# Patient Record
Sex: FEMALE | Race: WHITE | NOT HISPANIC OR LATINO | Employment: UNEMPLOYED | URBAN - NONMETROPOLITAN AREA
[De-identification: names, ages, dates, MRNs, and addresses within clinical notes are randomized per-mention and may not be internally consistent; named-entity substitution may affect disease eponyms.]

---

## 2023-11-27 ENCOUNTER — OFFICE VISIT (OUTPATIENT)
Dept: PRIMARY CARE | Facility: CLINIC | Age: 63
End: 2023-11-27
Payer: COMMERCIAL

## 2023-11-27 ENCOUNTER — PHARMACY VISIT (OUTPATIENT)
Dept: PHARMACY | Facility: CLINIC | Age: 63
End: 2023-11-27
Payer: COMMERCIAL

## 2023-11-27 VITALS
DIASTOLIC BLOOD PRESSURE: 75 MMHG | SYSTOLIC BLOOD PRESSURE: 136 MMHG | HEART RATE: 88 BPM | BODY MASS INDEX: 33.8 KG/M2 | HEIGHT: 64 IN | WEIGHT: 198 LBS

## 2023-11-27 DIAGNOSIS — G47.419 PRIMARY NARCOLEPSY WITHOUT CATAPLEXY (HHS-HCC): ICD-10-CM

## 2023-11-27 DIAGNOSIS — R60.0 BILATERAL LEG EDEMA: ICD-10-CM

## 2023-11-27 DIAGNOSIS — M79.7 FIBROMYALGIA: Primary | ICD-10-CM

## 2023-11-27 DIAGNOSIS — F31.81 BIPOLAR 2 DISORDER (MULTI): ICD-10-CM

## 2023-11-27 DIAGNOSIS — F41.9 ANXIETY DISORDER, UNSPECIFIED TYPE: ICD-10-CM

## 2023-11-27 PROBLEM — Z86.59 PERSONAL HISTORY OF OTHER MENTAL AND BEHAVIORAL DISORDERS: Status: ACTIVE | Noted: 2023-11-27

## 2023-11-27 PROCEDURE — 1036F TOBACCO NON-USER: CPT | Performed by: PHYSICIAN ASSISTANT

## 2023-11-27 PROCEDURE — 99214 OFFICE O/P EST MOD 30 MIN: CPT | Performed by: PHYSICIAN ASSISTANT

## 2023-11-27 RX ORDER — METHYLPHENIDATE HYDROCHLORIDE 5 MG/1
5 TABLET ORAL DAILY PRN
COMMUNITY

## 2023-11-27 RX ORDER — QUETIAPINE FUMARATE 50 MG/1
50 TABLET, FILM COATED ORAL NIGHTLY
Qty: 30 TABLET | Refills: 1 | Status: SHIPPED | OUTPATIENT
Start: 2023-11-27 | End: 2023-12-28 | Stop reason: WASHOUT

## 2023-11-27 ASSESSMENT — PATIENT HEALTH QUESTIONNAIRE - PHQ9
2. FEELING DOWN, DEPRESSED OR HOPELESS: NEARLY EVERY DAY
SUM OF ALL RESPONSES TO PHQ9 QUESTIONS 1 AND 2: 6
1. LITTLE INTEREST OR PLEASURE IN DOING THINGS: NEARLY EVERY DAY

## 2023-11-27 NOTE — PROGRESS NOTES
"Subjective   Patient ID: Erma Castro is a 63 y.o. female who presents for Depression (Patient having increased depression x 1 month and states Lamictal not working well, states gets very tearful easily , angry and frustrated. ).  HPI  Patient presents with multiple complaints.    Patient is requesting possible treatment for worsening bipolar 2.  She reports increased depression, tearfulness, mood instability, and quick to anger.  Patient does see psychiatry but does not have wilbert in this provider.  Patient is requesting a referral to a different psychiatrist and possible treatment in the meantime.  No reported suicidal ideation.    Patient is also requesting evaluation of lower extremity skin discoloration.  Patient states has been there for some time but has been worse lately.  No bleeding or drainage from the areas.  No reported attempted conservative management.  No known precipitating event.    Patient also is requesting a note for jury duty.  Patient has bipolar 2, narcolepsy, and fibromyalgia all of which would inhibit the patient from being an effective juror.  Patient is requesting a note to this effect to give to the courts.    Review of Systems    Constitutional:  See HPI   Integumentary: See HPI  Neurologic:  Alert and oriented X4, No numbness, No tingling.    All other systems are negative     Objective     /75   Pulse 88   Ht 1.613 m (5' 3.5\")   Wt 89.8 kg (198 lb)   BMI 34.52 kg/m²     Physical Exam    General:  Alert and oriented, No acute distress.    Eye:  Pupils are equal, round and reactive to light, Normal conjunctiva.    HENT:  Normocephalic,   Neck:  Supple    Respiratory: Respirations are non-labored   Musculoskeletal: Normal ROM and strength  Integumentary: Right lower extremity with 1+ pitting edema in left lower extremity with 0-1+ pitting edema; right lower leg with brownish skin color changes consistent with stasis dermatitis, chronic; there are 2 small areas of telangiectasia " type vessel structures; approximately, there are large areas of varicosities  Neurologic:  Alert, Oriented, Normal sensory, Cranial Nerves II-XII are grossly intact  Psychiatric:  Cooperative, Appropriate mood & affect.    Assessment/Plan   Anxiety/depression//Bipolar 2: So the patient reports having been on multiple medications in the past.  Patient is taking Lamictal currently.  Recommended Vraylar however it is not covered in the patient's insurance.  Will try Seroquel 50 mg nightly.  Follow-up 1 month.  Psychiatry referral was also arranged.    Note was written for jury duty    Lower extremity edema: There is mild lower extremity edema bilaterally.  Likely venous insufficiency.  Recommend over-the-counter grade compression socks.  Problem List Items Addressed This Visit       Anxiety disorder, unspecified    Relevant Orders    Referral to Psychiatry     Other Visit Diagnoses       Fibromyalgia    -  Primary    Bipolar 2 disorder (CMS/HCC)        Relevant Medications    QUEtiapine (SEROquel) 50 mg tablet    Other Relevant Orders    Referral to Psychiatry    Bilateral leg edema        Primary narcolepsy without cataplexy                Final diagnoses:   [M79.7] Fibromyalgia   [F31.81] Bipolar 2 disorder (CMS/HCC)   [R60.0] Bilateral leg edema   [G47.419] Primary narcolepsy without cataplexy   [F41.9] Anxiety disorder, unspecified type

## 2023-11-27 NOTE — LETTER
November 27, 2023     Patient: Erma Castro   YOB: 1960   Date of Visit: 11/27/2023       To Whom It May Concern:    Erma Castro was seen in my clinic on 11/27/2023 at 10:50 am and is under my care. Please excuse Erma from jury duty as she is treated for multiple health conditions that would inhibit her from being capable of serving as a juror.    If you have any questions or concerns, please don't hesitate to call.         Sincerely,         Scooter tSeven PA-C        CC: No Recipients

## 2023-12-05 ENCOUNTER — PHARMACY VISIT (OUTPATIENT)
Dept: PHARMACY | Facility: CLINIC | Age: 63
End: 2023-12-05
Payer: COMMERCIAL

## 2023-12-05 PROCEDURE — RXMED WILLOW AMBULATORY MEDICATION CHARGE

## 2023-12-07 ENCOUNTER — PHARMACY VISIT (OUTPATIENT)
Dept: PHARMACY | Facility: CLINIC | Age: 63
End: 2023-12-07

## 2023-12-07 PROCEDURE — RXOTC WILLOW AMBULATORY OTC CHARGE

## 2023-12-28 ENCOUNTER — OFFICE VISIT (OUTPATIENT)
Dept: PRIMARY CARE | Facility: CLINIC | Age: 63
End: 2023-12-28
Payer: COMMERCIAL

## 2023-12-28 VITALS
DIASTOLIC BLOOD PRESSURE: 77 MMHG | SYSTOLIC BLOOD PRESSURE: 119 MMHG | BODY MASS INDEX: 34.52 KG/M2 | WEIGHT: 202.2 LBS | HEIGHT: 64 IN | HEART RATE: 79 BPM

## 2023-12-28 DIAGNOSIS — F31.81 BIPOLAR 2 DISORDER (MULTI): ICD-10-CM

## 2023-12-28 DIAGNOSIS — S89.91XA INJURY OF RIGHT KNEE, INITIAL ENCOUNTER: Primary | ICD-10-CM

## 2023-12-28 PROCEDURE — 1036F TOBACCO NON-USER: CPT | Performed by: PHYSICIAN ASSISTANT

## 2023-12-28 PROCEDURE — 99213 OFFICE O/P EST LOW 20 MIN: CPT | Performed by: PHYSICIAN ASSISTANT

## 2023-12-28 RX ORDER — MELATONIN 3 MG
3 CAPSULE ORAL EVERY EVENING
COMMUNITY

## 2023-12-28 ASSESSMENT — PATIENT HEALTH QUESTIONNAIRE - PHQ9
SUM OF ALL RESPONSES TO PHQ9 QUESTIONS 1 AND 2: 2
1. LITTLE INTEREST OR PLEASURE IN DOING THINGS: SEVERAL DAYS
2. FEELING DOWN, DEPRESSED OR HOPELESS: SEVERAL DAYS

## 2023-12-28 NOTE — PROGRESS NOTES
"Subjective   Patient ID: Erma Castro is a 63 y.o. female who presents for Follow-up (FU 1 month, patient states did not start on Seroquel, discussed with therapist and pharmacist regarding side effect of drowsiness. Patient dx with narcolepsy and did not want any extra help with drowsiness./Patient states the Lamictal was therefore increased by psychiatrist at [facility].).  HPI  Patient presents in 1 month follow-up.  Patient was seen and treated here for bipolar 2 disorder 1 month ago and the Seroquel was prescribed.  Patient did not start this medication for fear of side effects.    Patient reports having fallen approximately 1 month ago.  Patient fell directly on the right knee and is having some persistent pain.  There is no pain with ambulation and no reduced function of the knee.  Patient reports pain with direct pressure when kneeling.  No other injuries as a result of the fall.  No other complaints.    Review of Systems    Constitutional:  See HPI     Musculoskeletal: see HPI  Integumentary:  No rash.    Neurologic:  Alert and oriented X4, No numbness, No tingling.    All other systems are negative     Objective     /77   Pulse 79   Ht 1.613 m (5' 3.5\")   Wt 91.7 kg (202 lb 3.2 oz)   BMI 35.26 kg/m²     Physical Exam    General:  Alert and oriented, No acute distress.    Eye:  Pupils are equal, round and reactive to light, Normal conjunctiva.    HENT:  Normocephalic,   Neck:  Supple    Respiratory: Respirations are non-labored   Musculoskeletal: Normal ROM and strength  Integumentary:  Warm, Dry, Intact, No pallor, No rash.    Neurologic:  Alert, Oriented, Normal sensory, Cranial Nerves II-XII are grossly intact  Psychiatric:  Cooperative, Appropriate mood & affect.    Assessment/Plan     Bipolar 2 disorder: Continue with psychiatry as scheduled    Injury of the right knee: Patient declined x-ray at this time.  Will revisit if pain persists  Problem List Items Addressed This Visit    None  Visit " Diagnoses       Injury of right knee, initial encounter    -  Primary    Bipolar 2 disorder (CMS/[facility])                Final diagnoses:   [S89.91XA] Injury of right knee, initial encounter   [F31.81] Bipolar 2 disorder (CMS/[facility])

## 2023-12-30 ENCOUNTER — PHARMACY VISIT (OUTPATIENT)
Dept: PHARMACY | Facility: CLINIC | Age: 63
End: 2023-12-30
Payer: COMMERCIAL

## 2023-12-30 PROCEDURE — RXMED WILLOW AMBULATORY MEDICATION CHARGE

## 2024-02-12 PROCEDURE — RXMED WILLOW AMBULATORY MEDICATION CHARGE

## 2024-02-21 ENCOUNTER — PHARMACY VISIT (OUTPATIENT)
Dept: PHARMACY | Facility: CLINIC | Age: 64
End: 2024-02-21
Payer: COMMERCIAL

## 2024-02-21 PROCEDURE — RXOTC WILLOW AMBULATORY OTC CHARGE

## 2024-03-25 ENCOUNTER — APPOINTMENT (OUTPATIENT)
Dept: SLEEP MEDICINE | Facility: CLINIC | Age: 64
End: 2024-03-25
Payer: COMMERCIAL

## 2024-03-28 ENCOUNTER — PHARMACY VISIT (OUTPATIENT)
Dept: PHARMACY | Facility: CLINIC | Age: 64
End: 2024-03-28

## 2024-03-28 PROCEDURE — RXOTC WILLOW AMBULATORY OTC CHARGE

## 2024-04-05 PROCEDURE — RXMED WILLOW AMBULATORY MEDICATION CHARGE

## 2024-04-06 ENCOUNTER — PHARMACY VISIT (OUTPATIENT)
Dept: PHARMACY | Facility: CLINIC | Age: 64
End: 2024-04-06
Payer: COMMERCIAL

## 2024-04-06 PROCEDURE — RXOTC WILLOW AMBULATORY OTC CHARGE

## 2024-05-03 ENCOUNTER — PHARMACY VISIT (OUTPATIENT)
Dept: PHARMACY | Facility: CLINIC | Age: 64
End: 2024-05-03
Payer: COMMERCIAL

## 2024-05-03 ENCOUNTER — OFFICE VISIT (OUTPATIENT)
Dept: PRIMARY CARE | Facility: CLINIC | Age: 64
End: 2024-05-03
Payer: COMMERCIAL

## 2024-05-03 ENCOUNTER — HOSPITAL ENCOUNTER (OUTPATIENT)
Dept: RADIOLOGY | Facility: HOSPITAL | Age: 64
Discharge: HOME | End: 2024-05-03
Payer: COMMERCIAL

## 2024-05-03 VITALS
DIASTOLIC BLOOD PRESSURE: 84 MMHG | HEIGHT: 64 IN | BODY MASS INDEX: 34.88 KG/M2 | HEART RATE: 83 BPM | SYSTOLIC BLOOD PRESSURE: 139 MMHG | WEIGHT: 204.3 LBS

## 2024-05-03 DIAGNOSIS — F31.81 BIPOLAR 2 DISORDER (MULTI): ICD-10-CM

## 2024-05-03 DIAGNOSIS — Z13.6 SCREENING FOR CARDIOVASCULAR CONDITION: ICD-10-CM

## 2024-05-03 DIAGNOSIS — Z86.59 PERSONAL HISTORY OF OTHER MENTAL AND BEHAVIORAL DISORDERS: ICD-10-CM

## 2024-05-03 DIAGNOSIS — Z12.31 BREAST CANCER SCREENING BY MAMMOGRAM: ICD-10-CM

## 2024-05-03 DIAGNOSIS — G47.419 PRIMARY NARCOLEPSY WITHOUT CATAPLEXY (HHS-HCC): ICD-10-CM

## 2024-05-03 DIAGNOSIS — S76.011A HIP STRAIN, RIGHT, INITIAL ENCOUNTER: ICD-10-CM

## 2024-05-03 DIAGNOSIS — S76.011A HIP STRAIN, RIGHT, INITIAL ENCOUNTER: Primary | ICD-10-CM

## 2024-05-03 PROCEDURE — 73502 X-RAY EXAM HIP UNI 2-3 VIEWS: CPT | Mod: RIGHT SIDE | Performed by: RADIOLOGY

## 2024-05-03 PROCEDURE — 73502 X-RAY EXAM HIP UNI 2-3 VIEWS: CPT | Mod: RT

## 2024-05-03 PROCEDURE — RXMED WILLOW AMBULATORY MEDICATION CHARGE

## 2024-05-03 RX ORDER — MELOXICAM 15 MG/1
15 TABLET ORAL DAILY
Qty: 30 TABLET | Refills: 1 | Status: SHIPPED | OUTPATIENT
Start: 2024-05-03 | End: 2024-06-04 | Stop reason: SDUPTHER

## 2024-05-03 RX ORDER — DICLOFENAC SODIUM 10 MG/G
4 GEL TOPICAL 4 TIMES DAILY
Qty: 100 G | Refills: 1 | Status: SHIPPED | OUTPATIENT
Start: 2024-05-03

## 2024-05-03 ASSESSMENT — ENCOUNTER SYMPTOMS
EYE REDNESS: 0
COUGH: 0
HALLUCINATIONS: 0
PALPITATIONS: 0
WEAKNESS: 0
FEVER: 0
CONSTIPATION: 0
HEMATURIA: 0
FATIGUE: 0
SINUS PRESSURE: 0
EYE ITCHING: 0
SHORTNESS OF BREATH: 0
SORE THROAT: 0
BACK PAIN: 0
VOMITING: 0
EYE PAIN: 0
CHILLS: 0
SINUS PAIN: 0
ABDOMINAL PAIN: 0
NUMBNESS: 0
COLOR CHANGE: 0
ARTHRALGIAS: 1
NAUSEA: 0
HEADACHES: 0
DIFFICULTY URINATING: 0
DIZZINESS: 0
DIARRHEA: 0
FLANK PAIN: 0

## 2024-05-03 NOTE — PROGRESS NOTES
"Subjective   Patient ID: Erma Castro is a 63 y.o. female who presents for Follow-up and Hip Pain (Right hip pain x 1+ weeks).    HPI   Right hip pain, the pain \"comes and goes\" mostly occurs when she is exerting it; bending down, squatting. Denies n/t, does not radiate down her leg, takes Advil every 6hrs plus Acetaminophen between doses, she reports only mild relief. She rates it 6/10 at it's worst. She does report some weakness.  Right groin/thigh strain: xrays, mobic and voltaren gel ordered     Sleep disturbances, she can fall asleep but does not stay asleep long enough. Waking up around 3 am. She does fall asleep during that day.     Sees [facility] psychiatry for her bipolar 2. She is currently taking Lamictal for bipolar.     Review of Systems   Constitutional:  Negative for chills, fatigue and fever.   HENT:  Negative for congestion, sinus pressure, sinus pain and sore throat.    Eyes:  Negative for pain, redness and itching.   Respiratory:  Negative for cough and shortness of breath.    Cardiovascular:  Negative for chest pain, palpitations and leg swelling.   Gastrointestinal:  Negative for abdominal pain, constipation, diarrhea, nausea and vomiting.   Endocrine: Negative for cold intolerance and heat intolerance.   Genitourinary:  Negative for difficulty urinating, flank pain and hematuria.   Musculoskeletal:  Positive for arthralgias. Negative for back pain and gait problem.        Right hip   Skin:  Negative for color change.   Neurological:  Negative for dizziness, weakness, numbness and headaches.   Psychiatric/Behavioral:  Negative for hallucinations and suicidal ideas.        Objective   /84 (Patient Position: Sitting)   Pulse 83   Ht 1.613 m (5' 3.5\")   Wt 92.7 kg (204 lb 4.8 oz)   BMI 35.62 kg/m²     Physical Exam  Vitals and nursing note reviewed.   Constitutional:       Appearance: Normal appearance.   HENT:      Right Ear: Tympanic membrane normal.      Nose: Nose normal.   Eyes:      " Extraocular Movements: Extraocular movements intact.      Pupils: Pupils are equal, round, and reactive to light.   Cardiovascular:      Rate and Rhythm: Normal rate and regular rhythm.   Pulmonary:      Effort: Pulmonary effort is normal.      Breath sounds: Normal breath sounds.   Abdominal:      General: Abdomen is flat. Bowel sounds are normal.      Palpations: Abdomen is soft.   Musculoskeletal:      Cervical back: Normal range of motion.      Right hip: Tenderness present. Decreased range of motion.   Skin:     General: Skin is warm and dry.      Capillary Refill: Capillary refill takes less than 2 seconds.   Neurological:      Mental Status: She is alert and oriented to person, place, and time.   Psychiatric:         Mood and Affect: Mood normal.         Behavior: Behavior normal.         Assessment/Plan   Problem List Items Addressed This Visit             ICD-10-CM       Mental Health    Personal history of other mental and behavioral disorders Z86.59    Relevant Orders    TSH with reflex to Free T4 if abnormal    Bipolar 2 disorder (Multi) F31.81       Sleep    Primary narcolepsy without cataplexy ([facility]) G47.419     Other Visit Diagnoses         Codes    Hip strain, right, initial encounter    -  Primary S76.011A    Xrays, Mobic, Voltaren gel. Will follow up with results, consider PT if no improvement     Relevant Medications    meloxicam (Mobic) 15 mg tablet    diclofenac sodium (Voltaren) 1 % gel    Other Relevant Orders    XR hip right with pelvis when performed 2 or 3 views    Screening for cardiovascular condition     Z13.6    CBC CMP Lipid panel ordered     Relevant Orders    Comprehensive Metabolic Panel    CBC and Auto Differential    Lipid Panel    Breast cancer screening by mammogram     Z12.31    mammogram ordered today     Relevant Orders    BI mammo bilateral screening tomosynthesis            Immunizations   Flu shot   COVID  2022  PNA --  Shingles declined  RSV declined     Mammo  ordered  today  DEXA -- declined today  Colon cancer screening declined today  Pap total hysterectomy

## 2024-05-03 NOTE — PATIENT INSTRUCTIONS
-Do not take Advil, Aleve or Ibuprofen while taking Mobic  -Can still take Acetaminophen while taking Mobic  -Voltaren gel, apply directly to hip and groin

## 2024-05-04 ENCOUNTER — LAB (OUTPATIENT)
Dept: LAB | Facility: LAB | Age: 64
End: 2024-05-04
Payer: COMMERCIAL

## 2024-05-04 DIAGNOSIS — Z13.6 SCREENING FOR CARDIOVASCULAR CONDITION: ICD-10-CM

## 2024-05-04 DIAGNOSIS — Z86.59 PERSONAL HISTORY OF OTHER MENTAL AND BEHAVIORAL DISORDERS: ICD-10-CM

## 2024-05-04 LAB
ALBUMIN SERPL BCP-MCNC: 4.3 G/DL (ref 3.4–5)
ALP SERPL-CCNC: 85 U/L (ref 33–136)
ALT SERPL W P-5'-P-CCNC: 19 U/L (ref 7–45)
ANION GAP SERPL CALC-SCNC: 9 MMOL/L (ref 10–20)
AST SERPL W P-5'-P-CCNC: 15 U/L (ref 9–39)
BASOPHILS # BLD AUTO: 0.04 X10*3/UL (ref 0–0.1)
BASOPHILS NFR BLD AUTO: 0.8 %
BILIRUB SERPL-MCNC: 0.4 MG/DL (ref 0–1.2)
BUN SERPL-MCNC: 18 MG/DL (ref 6–23)
CALCIUM SERPL-MCNC: 9.5 MG/DL (ref 8.6–10.3)
CHLORIDE SERPL-SCNC: 106 MMOL/L (ref 98–107)
CHOLEST SERPL-MCNC: 177 MG/DL (ref 0–199)
CHOLESTEROL/HDL RATIO: 2.7
CO2 SERPL-SCNC: 29 MMOL/L (ref 21–32)
CREAT SERPL-MCNC: 0.62 MG/DL (ref 0.5–1.05)
EGFRCR SERPLBLD CKD-EPI 2021: >90 ML/MIN/1.73M*2
EOSINOPHIL # BLD AUTO: 0.11 X10*3/UL (ref 0–0.7)
EOSINOPHIL NFR BLD AUTO: 2.2 %
ERYTHROCYTE [DISTWIDTH] IN BLOOD BY AUTOMATED COUNT: 13.2 % (ref 11.5–14.5)
GLUCOSE SERPL-MCNC: 91 MG/DL (ref 74–99)
HCT VFR BLD AUTO: 41.9 % (ref 36–46)
HDLC SERPL-MCNC: 65 MG/DL
HGB BLD-MCNC: 13.3 G/DL (ref 12–16)
IMM GRANULOCYTES # BLD AUTO: 0.01 X10*3/UL (ref 0–0.7)
IMM GRANULOCYTES NFR BLD AUTO: 0.2 % (ref 0–0.9)
LDLC SERPL CALC-MCNC: 97 MG/DL
LYMPHOCYTES # BLD AUTO: 1.67 X10*3/UL (ref 1.2–4.8)
LYMPHOCYTES NFR BLD AUTO: 33.4 %
MCH RBC QN AUTO: 30.1 PG (ref 26–34)
MCHC RBC AUTO-ENTMCNC: 31.7 G/DL (ref 32–36)
MCV RBC AUTO: 95 FL (ref 80–100)
MONOCYTES # BLD AUTO: 0.35 X10*3/UL (ref 0.1–1)
MONOCYTES NFR BLD AUTO: 7 %
NEUTROPHILS # BLD AUTO: 2.82 X10*3/UL (ref 1.2–7.7)
NEUTROPHILS NFR BLD AUTO: 56.4 %
NON HDL CHOLESTEROL: 112 MG/DL (ref 0–149)
NRBC BLD-RTO: 0 /100 WBCS (ref 0–0)
PLATELET # BLD AUTO: 221 X10*3/UL (ref 150–450)
POTASSIUM SERPL-SCNC: 4 MMOL/L (ref 3.5–5.3)
PROT SERPL-MCNC: 7 G/DL (ref 6.4–8.2)
RBC # BLD AUTO: 4.42 X10*6/UL (ref 4–5.2)
SODIUM SERPL-SCNC: 140 MMOL/L (ref 136–145)
TRIGL SERPL-MCNC: 75 MG/DL (ref 0–149)
TSH SERPL-ACNC: 1.74 MIU/L (ref 0.44–3.98)
VLDL: 15 MG/DL (ref 0–40)
WBC # BLD AUTO: 5 X10*3/UL (ref 4.4–11.3)

## 2024-05-04 PROCEDURE — 80053 COMPREHEN METABOLIC PANEL: CPT

## 2024-05-04 PROCEDURE — 85025 COMPLETE CBC W/AUTO DIFF WBC: CPT

## 2024-05-04 PROCEDURE — 80061 LIPID PANEL: CPT

## 2024-05-04 PROCEDURE — 84443 ASSAY THYROID STIM HORMONE: CPT

## 2024-05-04 PROCEDURE — 36415 COLL VENOUS BLD VENIPUNCTURE: CPT

## 2024-05-14 PROCEDURE — RXMED WILLOW AMBULATORY MEDICATION CHARGE

## 2024-05-15 ENCOUNTER — PHARMACY VISIT (OUTPATIENT)
Dept: PHARMACY | Facility: CLINIC | Age: 64
End: 2024-05-15
Payer: COMMERCIAL

## 2024-05-27 PROCEDURE — RXMED WILLOW AMBULATORY MEDICATION CHARGE

## 2024-05-28 ENCOUNTER — PHARMACY VISIT (OUTPATIENT)
Dept: PHARMACY | Facility: CLINIC | Age: 64
End: 2024-05-28
Payer: COMMERCIAL

## 2024-06-04 DIAGNOSIS — S76.011A HIP STRAIN, RIGHT, INITIAL ENCOUNTER: ICD-10-CM

## 2024-06-04 RX ORDER — MELOXICAM 15 MG/1
15 TABLET ORAL DAILY
Qty: 30 TABLET | Refills: 1 | Status: SHIPPED | OUTPATIENT
Start: 2024-06-04 | End: 2024-08-03

## 2024-06-19 ENCOUNTER — PHARMACY VISIT (OUTPATIENT)
Dept: PHARMACY | Facility: CLINIC | Age: 64
End: 2024-06-19
Payer: COMMERCIAL

## 2024-06-19 PROCEDURE — RXMED WILLOW AMBULATORY MEDICATION CHARGE

## 2024-06-19 RX ORDER — LAMOTRIGINE 150 MG/1
TABLET ORAL
Qty: 60 TABLET | Refills: 1 | OUTPATIENT
Start: 2024-06-19

## 2024-06-24 PROCEDURE — RXMED WILLOW AMBULATORY MEDICATION CHARGE

## 2024-06-28 ENCOUNTER — PHARMACY VISIT (OUTPATIENT)
Dept: PHARMACY | Facility: CLINIC | Age: 64
End: 2024-06-28
Payer: COMMERCIAL

## 2024-07-24 ENCOUNTER — PHARMACY VISIT (OUTPATIENT)
Dept: PHARMACY | Facility: CLINIC | Age: 64
End: 2024-07-24
Payer: COMMERCIAL

## 2024-07-24 PROCEDURE — RXMED WILLOW AMBULATORY MEDICATION CHARGE

## 2024-07-26 DIAGNOSIS — S76.011A HIP STRAIN, RIGHT, INITIAL ENCOUNTER: ICD-10-CM

## 2024-07-26 RX ORDER — MELOXICAM 15 MG/1
15 TABLET ORAL DAILY
Qty: 30 TABLET | Refills: 1 | Status: SHIPPED | OUTPATIENT
Start: 2024-07-26 | End: 2024-09-24

## 2024-08-02 PROCEDURE — RXMED WILLOW AMBULATORY MEDICATION CHARGE

## 2024-08-04 ENCOUNTER — PHARMACY VISIT (OUTPATIENT)
Dept: PHARMACY | Facility: CLINIC | Age: 64
End: 2024-08-04
Payer: COMMERCIAL

## 2024-08-04 PROCEDURE — RXOTC WILLOW AMBULATORY OTC CHARGE

## 2024-08-23 ENCOUNTER — PHARMACY VISIT (OUTPATIENT)
Dept: PHARMACY | Facility: CLINIC | Age: 64
End: 2024-08-23
Payer: COMMERCIAL

## 2024-08-23 PROCEDURE — RXMED WILLOW AMBULATORY MEDICATION CHARGE

## 2024-09-19 PROCEDURE — RXMED WILLOW AMBULATORY MEDICATION CHARGE

## 2024-09-21 ENCOUNTER — PHARMACY VISIT (OUTPATIENT)
Dept: PHARMACY | Facility: CLINIC | Age: 64
End: 2024-09-21
Payer: COMMERCIAL

## 2024-09-22 PROCEDURE — RXMED WILLOW AMBULATORY MEDICATION CHARGE

## 2024-09-25 ENCOUNTER — PHARMACY VISIT (OUTPATIENT)
Dept: PHARMACY | Facility: CLINIC | Age: 64
End: 2024-09-25
Payer: COMMERCIAL

## 2024-10-01 DIAGNOSIS — S76.011A HIP STRAIN, RIGHT, INITIAL ENCOUNTER: ICD-10-CM

## 2024-10-01 RX ORDER — MELOXICAM 15 MG/1
15 TABLET ORAL DAILY
Qty: 30 TABLET | Refills: 1 | Status: SHIPPED | OUTPATIENT
Start: 2024-10-01 | End: 2024-11-30

## 2024-10-17 ENCOUNTER — PHARMACY VISIT (OUTPATIENT)
Dept: PHARMACY | Facility: CLINIC | Age: 64
End: 2024-10-17
Payer: COMMERCIAL

## 2024-10-17 PROCEDURE — RXMED WILLOW AMBULATORY MEDICATION CHARGE

## 2024-10-22 PROCEDURE — RXMED WILLOW AMBULATORY MEDICATION CHARGE

## 2024-10-23 ENCOUNTER — PHARMACY VISIT (OUTPATIENT)
Dept: PHARMACY | Facility: CLINIC | Age: 64
End: 2024-10-23
Payer: COMMERCIAL

## 2024-11-07 ENCOUNTER — APPOINTMENT (OUTPATIENT)
Dept: PRIMARY CARE | Facility: CLINIC | Age: 64
End: 2024-11-07
Payer: COMMERCIAL

## 2024-11-07 VITALS
HEIGHT: 64 IN | WEIGHT: 198.8 LBS | BODY MASS INDEX: 33.94 KG/M2 | HEART RATE: 84 BPM | DIASTOLIC BLOOD PRESSURE: 81 MMHG | SYSTOLIC BLOOD PRESSURE: 142 MMHG

## 2024-11-07 DIAGNOSIS — M19.90 ARTHRITIS: Primary | ICD-10-CM

## 2024-11-07 DIAGNOSIS — G47.421 NARCOLEPSY DUE TO UNDERLYING CONDITION WITH CATAPLEXY (HHS-HCC): ICD-10-CM

## 2024-11-07 PROCEDURE — 99214 OFFICE O/P EST MOD 30 MIN: CPT

## 2024-11-07 PROCEDURE — 1036F TOBACCO NON-USER: CPT

## 2024-11-07 PROCEDURE — 3008F BODY MASS INDEX DOCD: CPT

## 2024-11-07 ASSESSMENT — ENCOUNTER SYMPTOMS
PALPITATIONS: 0
LIGHT-HEADEDNESS: 0
FEVER: 0
SHORTNESS OF BREATH: 0
FATIGUE: 0
CHILLS: 0
ARTHRALGIAS: 1
SLEEP DISTURBANCE: 0
DYSURIA: 0
ABDOMINAL PAIN: 0
COUGH: 0
HEADACHES: 0
NERVOUS/ANXIOUS: 1
JOINT SWELLING: 1

## 2024-11-07 NOTE — PROGRESS NOTES
"Subjective   Patient ID: Erma Castro is a 64 y.o. female who presents for 6 month follow-up.    HPI   Here today for 6 month follow up     Arthritis reports she is taking Mobic in the AM and tylenol 1,300 MG TID    Narcolepsy she has been unable to see a sleep specialist due to not taking new patients.     Reports stressors in life have increased and she feels sometimes her mood is not stable. She still follows with Gabrielle next apt in December.  Review of Systems   Constitutional:  Negative for chills, fatigue and fever.   Respiratory:  Negative for cough and shortness of breath.    Cardiovascular:  Negative for chest pain and palpitations.   Gastrointestinal:  Negative for abdominal pain.   Genitourinary:  Negative for dysuria.   Musculoskeletal:  Positive for arthralgias and joint swelling.   Neurological:  Negative for light-headedness and headaches.   Psychiatric/Behavioral:  Negative for sleep disturbance and suicidal ideas. The patient is nervous/anxious.        Objective   /81 (Patient Position: Sitting)   Pulse 84   Ht 1.613 m (5' 3.5\")   Wt 90.2 kg (198 lb 12.8 oz)   BMI 34.66 kg/m²     Physical Exam  Cardiovascular:      Rate and Rhythm: Normal rate and regular rhythm.      Heart sounds: Normal heart sounds.   Pulmonary:      Breath sounds: Normal breath sounds.   Musculoskeletal:      Right lower leg: No edema.      Left lower leg: No edema.   Skin:     Capillary Refill: Capillary refill takes less than 2 seconds.   Neurological:      Mental Status: She is alert and oriented to person, place, and time.         Assessment/Plan   Problem List Items Addressed This Visit    None  Visit Diagnoses         Codes    Arthritis    -  Primary M19.90    Relevant Orders    Referral to Orthopaedic Surgery    Narcolepsy due to underlying condition with cataplexy ([facility])     G47.421    Relevant Orders    Referral to Adult Sleep Medicine             Arthritis  -Xray right hip show calcification of inferior " to the ischial tuberosity  -No relief from Mobic and Tylenol   -Ortho referral     Bipolar  -Still follows with Dr. Pugh and Gabrielle  -She is taking Lamictal, may need additional medicine    Narcolepsy  -referral to sleep medicine

## 2024-11-15 ENCOUNTER — APPOINTMENT (OUTPATIENT)
Dept: ORTHOPEDIC SURGERY | Facility: CLINIC | Age: 64
End: 2024-11-15
Payer: COMMERCIAL

## 2024-11-15 VITALS — WEIGHT: 198 LBS | BODY MASS INDEX: 34.52 KG/M2

## 2024-11-15 DIAGNOSIS — M19.90 ARTHRITIS: ICD-10-CM

## 2024-11-15 DIAGNOSIS — M54.41 CHRONIC RIGHT-SIDED LOW BACK PAIN WITH RIGHT-SIDED SCIATICA: Primary | ICD-10-CM

## 2024-11-15 DIAGNOSIS — G89.29 CHRONIC RIGHT-SIDED LOW BACK PAIN WITH RIGHT-SIDED SCIATICA: Primary | ICD-10-CM

## 2024-11-15 DIAGNOSIS — M25.551 RIGHT HIP PAIN: ICD-10-CM

## 2024-11-15 DIAGNOSIS — M46.1 SI JOINT ARTHRITIS: ICD-10-CM

## 2024-11-15 PROCEDURE — 99204 OFFICE O/P NEW MOD 45 MIN: CPT | Performed by: NURSE PRACTITIONER

## 2024-11-15 RX ORDER — DEXTROMETHORPHAN HYDROBROMIDE, GUAIFENESIN 5; 100 MG/5ML; MG/5ML
LIQUID ORAL
COMMUNITY
Start: 2022-06-01

## 2024-11-15 ASSESSMENT — PAIN DESCRIPTION - DESCRIPTORS: DESCRIPTORS: ACHING;DISCOMFORT;RADIATING;SHARP

## 2024-11-15 ASSESSMENT — ENCOUNTER SYMPTOMS
CARDIOVASCULAR NEGATIVE: 1
CONSTITUTIONAL NEGATIVE: 1
RESPIRATORY NEGATIVE: 1
ARTHRALGIAS: 1
HEMATOLOGIC/LYMPHATIC NEGATIVE: 1
NEUROLOGICAL NEGATIVE: 1
PSYCHIATRIC NEGATIVE: 1
ENDOCRINE NEGATIVE: 1

## 2024-11-15 ASSESSMENT — PAIN SCALES - GENERAL
PAINLEVEL_OUTOF10: 3
PAINLEVEL_OUTOF10: 3

## 2024-11-15 ASSESSMENT — PAIN - FUNCTIONAL ASSESSMENT: PAIN_FUNCTIONAL_ASSESSMENT: 0-10

## 2024-11-15 NOTE — PROGRESS NOTES
Subjective    Patient ID: Erma Castro is a 64 y.o. female.    Chief Complaint: Pain of the Lower Back (Xrays 5/3/24) and Pain of the Right Hip (Xrays 5/3/24)      HPI  Erma is a pleasant 64-year-old female here for assessment of low back pain and into posterior right hip.  Symptoms started in March 2024 while assisting her spouse with frequent on and off the ground type activities with ADLs after her spouse became injured.  Patient is noticing main symptoms to low right sided back, posterior waistline and around to left SI joint region.  Mild aggravation into the hip joint at times.  Symptoms are aggravated with standing, prolonged sitting, sitting on firm chairs causes symptoms to radiate around towards the groin.  Symptoms are worse in the mornings and is experiencing these on a daily basis.  Patient does have a history of fibromyalgia but feels these symptoms are different.  No numbness to groin or lower extremities, no loss of bowel or bladder control.  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  This does flare intermittently.  Does experience mechanical symptoms like locking or catching, occasionally loses balance from this.  Meloxicam and Tylenol arthritis help with symptoms.    Review of Systems   Constitutional: Negative.    HENT: Negative.     Respiratory: Negative.     Cardiovascular: Negative.    Endocrine: Negative.    Musculoskeletal:  Positive for arthralgias.   Skin: Negative.    Neurological: Negative.    Hematological: Negative.    Psychiatric/Behavioral: Negative.        Objective   Right Hip Exam     Tenderness   The patient is experiencing tenderness in the anterior and posterior.    Range of Motion   Abduction:  normal   Adduction:  normal   Flexion:  120   External rotation:  60 normal     Tests   TRINITY: positive    Other   Erythema: absent  Sensation: normal  Pulse: present    Comments:  Negative  logroll  Negative straight leg raise  Full ROM of distal joints with no sx aggravation  Distal motor and sensory intact, cap refill at 2 seconds.      Back Exam     Tenderness   The patient is experiencing tenderness in the sacroiliac and lumbar.    Range of Motion   Extension:  normal   Flexion:  80   Lateral bend right:  normal   Lateral bend left:  70   Rotation right:  70   Rotation left:  normal     Tests   Straight leg raise right: negative  Straight leg raise left: negative    Other   Sensation: normal  Gait: normal     Comments:  Mild right side lower extremity weakness compared to the opposite.  Symptoms aggravated with single-leg stand.  Positive pain with range of motion affecting right SI joint as well as palpation.  Distal motor and sensory intact, cap refill at 2 seconds.            Image Results:  XR hip right with pelvis when performed 2 or 3 views  Narrative: Interpreted By:  Samia Heard,   STUDY:  Single view pelvis.  Right hip, two views.      INDICATION:  Signs/Symptoms:hip pain.      COMPARISON:  None.      ACCESSION NUMBER(S):  [id number]      ORDERING CLINICIAN:  ZAFAR AGUIRRE      FINDINGS:  No acute fracture or malalignment.  Bilateral hip joint spaces are well preserved.  Small calcification inferior to the right ischial tuberosity which  may represent calcific tendinosis within the hamstring origin.  Advanced degenerative changes of the partially visualized lumbar  spine with disc height loss and osteophytes. Soft tissues are within  normal limits.      Impression: 1. Advanced degenerative changes of the partially visualized lumbar  spine  2. Small calcification inferior to the right ischial tuberosity which  may represent calcific tendinosis within the hamstring origin.      MACRO:  None.      Signed by: Samia Heard 5/5/2024 5:07 PM  Dictation workstation:   [id number]    Independent review of x-rays completed during today's visit and discussed with patient and family.  There  is a small calcification inferior to the right ischial tuberosity, correlates with past medical history and likely calcific tendinosis of the hamstring origin.  No acute fracture or misalignment of the bilateral hip spaces noted, questionable acetabular degenerative changes of the right hip.  What is visualized of low lumbar and SI joints, suspect degenerative changes.  Order lumbar x-ray on outpatient basis.    Assessment/Plan   Encounter Diagnoses:  Problem List Items Addressed This Visit             ICD-10-CM    Chronic right-sided low back pain with right-sided sciatica - Primary M54.41, G89.29     We discussed symptom control with previously prescribed meloxicam once daily with food.  Reviewed dose of 15 mg p.o.  daily, discussed with patient this is maximum recommended dosing for this medication.  We did discuss Tylenol arthritis with max dosing 3 doses per day per package directions.  We reviewed topical OTC formulas including lidocaine patches to use 12 hours on 12 hours off, if this is not effective she may try Voltaren topical gel up to 4 times daily.  Recommending aquatic  PT for strengthening with plan to follow-up here in approximately 4 to 6 weeks, near end of PT.  Sooner for any changes or concerns.   We reviewed red flag symptoms for urgent evaluation in the ED for back pain including loss of bowel or bladder control, saddle anesthesia, progressive paresthesias, stumbles, falls or other concerns.   We did discuss strong possibility of hip component to current symptoms, suspect labral injury years ago.  Will continue to monitor and follow, we did discuss possibility of advanced imaging and/or referral to pain management for consideration of SI joint injection if back symptoms fail to improve with current treatment plan.  In agreement with plan of care  This note was generated using Dragon software.  It may contain errors in wording, punctuation or spelling.         SI joint arthritis M46.1     Arthritis M19.90    Relevant Orders    Referral to Physical Therapy    Right hip pain M25.553

## 2024-11-16 PROBLEM — M19.90 ARTHRITIS: Status: ACTIVE | Noted: 2024-11-16

## 2024-11-16 PROBLEM — M25.551 RIGHT HIP PAIN: Status: ACTIVE | Noted: 2024-11-16

## 2024-11-16 PROBLEM — G89.29 CHRONIC RIGHT-SIDED LOW BACK PAIN WITH RIGHT-SIDED SCIATICA: Status: ACTIVE | Noted: 2024-11-16

## 2024-11-16 PROBLEM — M54.41 CHRONIC RIGHT-SIDED LOW BACK PAIN WITH RIGHT-SIDED SCIATICA: Status: ACTIVE | Noted: 2024-11-16

## 2024-11-16 PROBLEM — M46.1 SI JOINT ARTHRITIS: Status: ACTIVE | Noted: 2024-11-16

## 2024-11-20 PROCEDURE — RXMED WILLOW AMBULATORY MEDICATION CHARGE

## 2024-11-21 ENCOUNTER — PHARMACY VISIT (OUTPATIENT)
Dept: PHARMACY | Facility: CLINIC | Age: 64
End: 2024-11-21
Payer: COMMERCIAL

## 2024-11-26 ENCOUNTER — EVALUATION (OUTPATIENT)
Dept: PHYSICAL THERAPY | Facility: CLINIC | Age: 64
End: 2024-11-26
Payer: COMMERCIAL

## 2024-11-26 DIAGNOSIS — M19.90 ARTHRITIS: ICD-10-CM

## 2024-11-26 DIAGNOSIS — M54.41 CHRONIC RIGHT-SIDED LOW BACK PAIN WITH RIGHT-SIDED SCIATICA: Primary | ICD-10-CM

## 2024-11-26 DIAGNOSIS — G89.29 CHRONIC RIGHT-SIDED LOW BACK PAIN WITH RIGHT-SIDED SCIATICA: Primary | ICD-10-CM

## 2024-11-26 PROBLEM — M54.50 LOW BACK PAIN: Status: ACTIVE | Noted: 2024-11-26

## 2024-11-26 PROCEDURE — 97161 PT EVAL LOW COMPLEX 20 MIN: CPT | Mod: GP

## 2024-11-26 PROCEDURE — 97110 THERAPEUTIC EXERCISES: CPT | Mod: GP

## 2024-11-26 ASSESSMENT — ENCOUNTER SYMPTOMS
OCCASIONAL FEELINGS OF UNSTEADINESS: 1
DEPRESSION: 0
LOSS OF SENSATION IN FEET: 0

## 2024-11-26 ASSESSMENT — PATIENT HEALTH QUESTIONNAIRE - PHQ9
2. FEELING DOWN, DEPRESSED OR HOPELESS: NOT AT ALL
1. LITTLE INTEREST OR PLEASURE IN DOING THINGS: NOT AT ALL
SUM OF ALL RESPONSES TO PHQ9 QUESTIONS 1 AND 2: 0

## 2024-11-26 ASSESSMENT — PAIN SCALES - GENERAL: PAINLEVEL_OUTOF10: 4

## 2024-11-26 ASSESSMENT — ACTIVITIES OF DAILY LIVING (ADL): EFFECT OF PAIN ON DAILY ACTIVITIES: SEE GOALS

## 2024-11-26 ASSESSMENT — PAIN - FUNCTIONAL ASSESSMENT: PAIN_FUNCTIONAL_ASSESSMENT: 0-10

## 2024-11-26 NOTE — PROGRESS NOTES
Physical Therapy Evaluation and Treatment      Patient Name: Erma Castro  MRN: [id number]  Today's Date: 2024  : 1960  Julia Muñoz  Time Calculation  Start Time: 1048  Stop Time: 1130  Time Calculation (min): 42 min    PT Evaluation Time Entry  PT Evaluation (Low) Time Entry: 30   PT Therapeutic Procedures Time Entry  Therapeutic Exercise Time Entry: 10                         Assessment:  Rehab Prognosis: Good  Barriers to Participation:  (none)    Plan:  Treatment/Interventions: Aquatic therapy, Cryotherapy, Dry needling, Education/ Instruction, Electrical stimulation, Gait training, Hot pack, Manual therapy, Mechanical traction, Self care/ home management, Therapeutic exercises, Other (comment) (IASTM/cupping)  PT Plan: Skilled PT  PT Frequency: 2 times per week  Duration: 4wks  Onset Date: 03/15/24  Certification Period Start Date: 24  Certification Period End Date: 25  Rehab Potential: Good  Plan of Care Agreement: Patient    Current Problem:   1. Chronic right-sided low back pain with right-sided sciatica  Follow Up In Physical Therapy      2. Arthritis  Referral to Physical Therapy    Follow Up In Physical Therapy          Subjective    General:  General  Reason for Referral: arthritis  Referred By: Toni  General Comment:   C/C sx*/Max sx level*:4/10 LBP/R hip/R thigh  ADELAIDE/DOI/Work*?:  associated with bending since march  ADL makes worse*?:bending; prolonged stdg  ADL makes better?:-----  PLOF:Unlimited job/home tasks performed- YES:   (see goals)  Testing*:5/3/24 films show-1. Advanced degenerative changes of the partially visualized lumbar  spine  2. Small calcification inferior to the right ischial tuberosity which  may represent calcific tendinosis within the hamstring origin.    Physical Findings*: Limited: AROM ( trunk )                                                Strength (abdominals  )                                              POC:  Ongoing clinic PT to  "include: will continue with core stability exercises, trunk mech ed/ex and STW PRN;  may trial lumbar unloading if needed (pool??)    Other: (copay*?- no ) (fall risk*?- low  ) (ins visit limit*?- no  )     Precautions:  Precautions  Precautions Comment: low fall risk; nbipolar; FM (per patient)    Pain:  Pain Assessment  Pain Assessment: 0-10  0-10 (Numeric) Pain Score: 4 (max sx)  Pain Location: Back  Pain Orientation: Lower, Right (also R hip and thigh area)  Pain Onset:  (associated with bending)  Effect of Pain on Daily Activities: see goals    Prior Level of Function:  Prior Function Per Pt/Caregiver Report  Vocational:  (homemaker)    Objective     Outcome Measures:  Other Measures  Oswestry Disablity Index (KELLY): 23     Treatments:  Trunk mech ed  Seated alt UE flexion with TA set x10 ea  Seated Hadd with TA set x10 ea  POC:  Ongoing clinic PT to include: will continue with core stability exercises and STW PRN;  may trial lumbar unloading if needed (pool??);  the patient preferred not to do the pool if possible    OP EDUCATION:  Access Code: [email or web address]  URL: [email or web address]  Date: 11/26/2024  Prepared by: Terrence Connolly    Exercises  - Seated Single Arm Shoulder Horizontal Abduction and Adduction   - Seated Shoulder Front Raise with Resistance     Goals:  Active       PT Problem       PT Goal 1       Start:  11/26/24    Expected End:  02/24/25       1. Independent HEP to allow for 50% reduction in max ADL C/C sx ( 4/10) 2-3wks  2. 0/10 night time sx to allow for uninterrupted sleep x 1wk ( 7/7) 2-3wks  3. Survey score improvement from 46% to 35% (KELLY) 3-4wks  4. Strength increase to allow for improved ADL stdg/walking (from 4-/5 to 4+/5 abdominals) 3-4wks  5. ROM increase to allow for improved ADL car transfers (from 75% to 100% SFB) 3-4wks         PT Goal 2       Start:  11/26/24    Expected End:  02/24/25       1. \"I want my  back/hip/thigh  to feel better\".              Lumbar " Spine      Lumbar AROM  Lumbar flexion: (60°): 75% (LBP)  Lumbar extension (25°): WFL  Lumbar sidebend right (25°): 75% (RLBP)  Lumbar sidebend left (25°): WFL    Lumbar Myotomes  Lumbar Myotomes WFL: yes

## 2024-12-02 PROCEDURE — RXMED WILLOW AMBULATORY MEDICATION CHARGE

## 2024-12-04 ENCOUNTER — TREATMENT (OUTPATIENT)
Dept: PHYSICAL THERAPY | Facility: CLINIC | Age: 64
End: 2024-12-04
Payer: COMMERCIAL

## 2024-12-04 DIAGNOSIS — M19.90 ARTHRITIS: ICD-10-CM

## 2024-12-04 DIAGNOSIS — G89.29 CHRONIC RIGHT-SIDED LOW BACK PAIN WITH RIGHT-SIDED SCIATICA: Primary | ICD-10-CM

## 2024-12-04 DIAGNOSIS — M54.41 CHRONIC RIGHT-SIDED LOW BACK PAIN WITH RIGHT-SIDED SCIATICA: Primary | ICD-10-CM

## 2024-12-04 PROCEDURE — 97110 THERAPEUTIC EXERCISES: CPT | Mod: GP,CQ

## 2024-12-04 PROCEDURE — 97140 MANUAL THERAPY 1/> REGIONS: CPT | Mod: GP,CQ

## 2024-12-04 ASSESSMENT — PAIN SCALES - GENERAL: PAINLEVEL_OUTOF10: 2

## 2024-12-04 ASSESSMENT — PAIN - FUNCTIONAL ASSESSMENT: PAIN_FUNCTIONAL_ASSESSMENT: 0-10

## 2024-12-04 NOTE — PROGRESS NOTES
"Physical Therapy Treatment    Patient Name: Erma Castro  MRN: [id number]  Today's Date: 12/4/2024  Time Calculation  Start Time: 0742  Stop Time: 0828  Time Calculation (min): 46 min  PT Therapeutic Procedures Time Entry  Manual Therapy Time Entry: 11  Therapeutic Exercise Time Entry: 32              Current Problem  Problem List Items Addressed This Visit             ICD-10-CM       Musculoskeletal and Injuries    Arthritis M19.90    Low back pain - Primary M54.50         Subjective She reports pain worse in am. Notes she can not sit or stand  long time frames or Sxs increase. Notes compliance to HEP.   General  Reason for Referral: arthritis  Referred By: Toni  General Comment: 2/9  Precautions  Precautions  Precautions Comment: low fall risk; nbipolar; FM (per patient)  Pain  Pain Assessment: 0-10  0-10 (Numeric) Pain Score: 2  Pain Location: Back  Pain Orientation: Lower, Right  Objective:    Assessment:Patient identified by name and birth date. IASTM/STM and cupping  completed to reduce pain and soft tissue restriction in R side LB and hip musculature. Able to progress core and proximal hip stability exercises with minimal c/o.       Plan:Continue with manual therapy to reduce soft tissue restriction /pain and core /proximal hip strengthening to allow  prolonged sitting/standing without reports of increased LBP. -CG.     OP PT Plan  Treatment/Interventions: Aquatic therapy, Cryotherapy, Dry needling, Education/ Instruction, Electrical stimulation, Gait training, Hot pack, Manual therapy, Mechanical traction, Self care/ home management, Therapeutic exercises, Other (comment) (IASTM/cupping)  PT Plan: Skilled PT  PT Frequency: 2 times per week  Duration: 4wks  Onset Date: 03/15/24  Certification Period Start Date: 11/26/24  Certification Period End Date: 02/24/25  Rehab Potential: Good  Plan of Care Agreement: Patient    Treatments:  Sci Fit L1 6 mins   LTR 10x each (N)  SKTC with Strap 10x 5\" holds each (N)  QL " "Stretch 5x5\" holds R (N)  Psoas Stretch figure 4 5x5\" R (N)  Hip Add ISO with play ball 10x 5\" holds   Hip Abd with orange band 10x (N)  Mini March with orange band 10x (N)  TrA Sets 10x 5\" holds   Seated alt UE flexion with TA set x10 ea  Rows with TrA and purple  cords  10x (N)  Pull Downs with TrA and teal cords 10x (N)    OP EDUCATION:  Outpatient Education  Individual(s) Educated: Patient, Spouse  Patient/Caregiver Demonstrated Understanding: yes  Plan of Care Discussed and Agreed Upon: yesAccess Code: [email or web address]  URL: [email or web address]  Date: 11/26/2024  Prepared by: Terrence Connolly     Exercises  - Seated Single Arm Shoulder Horizontal Abduction and Adduction   - Seated Shoulder Front Raise with Resistance     Goals:  Active       PT Problem       PT Goal 1       Start:  11/26/24    Expected End:  02/24/25       1. Independent HEP to allow for 50% reduction in max ADL C/C sx ( 4/10) 2-3wks  2. 0/10 night time sx to allow for uninterrupted sleep x 1wk ( 7/7) 2-3wks  3. Survey score improvement from 46% to 35% (KELLY) 3-4wks  4. Strength increase to allow for improved ADL stdg/walking (from 4-/5 to 4+/5 abdominals) 3-4wks  5. ROM increase to allow for improved ADL car transfers (from 75% to 100% SFB) 3-4wks         PT Goal 2       Start:  11/26/24    Expected End:  02/24/25       1. \"I want my  back/hip/thigh  to feel better\".             "

## 2024-12-08 ENCOUNTER — PHARMACY VISIT (OUTPATIENT)
Dept: PHARMACY | Facility: CLINIC | Age: 64
End: 2024-12-08
Payer: COMMERCIAL

## 2024-12-11 ENCOUNTER — TREATMENT (OUTPATIENT)
Dept: PHYSICAL THERAPY | Facility: CLINIC | Age: 64
End: 2024-12-11
Payer: COMMERCIAL

## 2024-12-11 DIAGNOSIS — M19.90 ARTHRITIS: ICD-10-CM

## 2024-12-11 DIAGNOSIS — M54.41 CHRONIC RIGHT-SIDED LOW BACK PAIN WITH RIGHT-SIDED SCIATICA: Primary | ICD-10-CM

## 2024-12-11 DIAGNOSIS — G89.29 CHRONIC RIGHT-SIDED LOW BACK PAIN WITH RIGHT-SIDED SCIATICA: Primary | ICD-10-CM

## 2024-12-11 PROCEDURE — 97110 THERAPEUTIC EXERCISES: CPT | Mod: GP,CQ

## 2024-12-11 PROCEDURE — 97140 MANUAL THERAPY 1/> REGIONS: CPT | Mod: GP,CQ

## 2024-12-11 ASSESSMENT — PAIN - FUNCTIONAL ASSESSMENT: PAIN_FUNCTIONAL_ASSESSMENT: 0-10

## 2024-12-11 ASSESSMENT — PAIN SCALES - GENERAL
PAINLEVEL_OUTOF10: 0 - NO PAIN
PAINLEVEL_OUTOF10: 2

## 2024-12-11 NOTE — PROGRESS NOTES
Physical Therapy Treatment    Patient Name: Erma Castro  MRN: [id number]  Today's Date: 12/11/2024  Time Calculation  Start Time: 1045  Stop Time: 1131  Time Calculation (min): 46 min  PT Therapeutic Procedures Time Entry  Manual Therapy Time Entry: 10  Therapeutic Exercise Time Entry: 33              Current Problem  Problem List Items Addressed This Visit             ICD-10-CM       Musculoskeletal and Injuries    Arthritis M19.90    Low back pain - Primary M54.50         Subjective She reports pain with bending and reaching and transferring out of car.   General  Reason for Referral: arthritis  Referred By: Toni  General Comment: 3/9  Precautions  Precautions  Precautions Comment: low fall risk; nbipolar; FM (per patient)  Pain  Pain Assessment: 0-10  0-10 (Numeric) Pain Score: 0 - No pain  Pain Location: Back  Pain Orientation: Lower, Right  Multiple Pain Sites: Two  Objective:    Assessment:Patient identified by name and birth date. IASTM/STM completed to reduce pain and soft tissue restriction in R side LB/hip  musculature. This session updated HEP and bands given. She voiced good understanding. Completed all core stability exercises with minimal c/o.       Plan:Continue with manual therapy to reduce soft tissue restriction / pain and core strengthening to allow improved trunk stability for ease with bending and lifting items and with  functional transfers without reports of increased LBP.-CG.              OP PT Plan  Treatment/Interventions: Aquatic therapy, Cryotherapy, Dry needling, Education/ Instruction, Electrical stimulation, Gait training, Hot pack, Manual therapy, Mechanical traction, Self care/ home management, Therapeutic exercises, Other (comment) (IASTM/cupping)  PT Plan: Skilled PT  PT Frequency: 2 times per week  Duration: 4wks  Onset Date: 03/15/24  Certification Period Start Date: 11/26/24  Certification Period End Date: 02/24/25  Rehab Potential: Good  Plan of Care Agreement:  "Patient          Treatments:  Therapeutic Exercise:   Sci Fit L1 6 mins   LTR 10x each   SKTC with Strap 10x 5\" holds each   QL Stretch 5x5\" holds R   Psoas Stretch figure 4 5x5\" R   Hip Add ISO with play ball 10x 5\" holds   Hip Abd with lime grn  band 2x10  Mini March with lime grn  band 2x10  TrA Sets 10x 5\" holds   Seated alt UE flexion with TA set x10 ea  Rows with TrA and purple  cords  10x   Pull Downs with TrA and teal cords 10x      Manual: IASTM/STM to R side : QL/ paraspinals/glutes /abductors                    HG9 brush j hook frame bevel Up 0-30*    Static Cupping to R; QL glutes       OP EDUCATION:  Access Code: [email or web address]  URL: [email or web address]  Date: 12/11/2024  Prepared by: Karine Gusman    Exercises  - Seated Single Arm Shoulder Horizontal Abduction and Adduction   - Seated Shoulder Front Raise with Resistance   - Supine Lower Trunk Rotation  - 1 x daily - 7 x weekly - 2 sets - 10 reps  - Supine Single Knee to Chest Stretch  - 1 x daily - 7 x weekly - 1 sets - 10 reps - 5\" hold  - Supine Quadratus Lumborum Stretch  - 1 x daily - 7 x weekly - 1 sets - 5 reps - 5\" hold  - Modified Con Stretch  - 1 x daily - 7 x weekly - 1 sets - 5 reps - 5\" hold  - Supine Hip Adduction Isometric with Ball  - 1 x daily - 7 x weekly - 2 sets - 10 reps - 4\" hold  - Hooklying Clamshell with Resistance  - 1 x daily - 7 x weekly - 2 sets - 10 reps  - Supine March  - 1 x daily - 7 x weekly - 2 sets - 10 reps  - Supine Transversus Abdominis Bracing - Hands on Stomach  - 1 x daily - 7 x weekly - 3 sets - 10 reps - 4\" hold    Goals:  Active       PT Problem       PT Goal 1       Start:  11/26/24    Expected End:  02/24/25       1. Independent HEP to allow for 50% reduction in max ADL C/C sx ( 4/10) 2-3wks  2. 0/10 night time sx to allow for uninterrupted sleep x 1wk ( 7/7) 2-3wks  3. Survey score improvement from 46% to 35% (KELLY) 3-4wks  4. Strength increase to allow for improved ADL stdg/walking " "(from 4-/5 to 4+/5 abdominals) 3-4wks  5. ROM increase to allow for improved ADL car transfers (from 75% to 100% SFB) 3-4wks         PT Goal 2       Start:  11/26/24    Expected End:  02/24/25       1. \"I want my  back/hip/thigh  to feel better\".             "

## 2024-12-13 ENCOUNTER — HOSPITAL ENCOUNTER (OUTPATIENT)
Dept: RADIOLOGY | Facility: CLINIC | Age: 64
Discharge: HOME | End: 2024-12-13
Payer: COMMERCIAL

## 2024-12-13 ENCOUNTER — APPOINTMENT (OUTPATIENT)
Dept: ORTHOPEDIC SURGERY | Facility: CLINIC | Age: 64
End: 2024-12-13
Payer: COMMERCIAL

## 2024-12-13 ENCOUNTER — TREATMENT (OUTPATIENT)
Dept: PHYSICAL THERAPY | Facility: CLINIC | Age: 64
End: 2024-12-13
Payer: COMMERCIAL

## 2024-12-13 DIAGNOSIS — M54.50 LUMBAR PAIN: ICD-10-CM

## 2024-12-13 DIAGNOSIS — G89.29 CHRONIC RIGHT-SIDED LOW BACK PAIN WITH RIGHT-SIDED SCIATICA: ICD-10-CM

## 2024-12-13 DIAGNOSIS — M19.90 ARTHRITIS: ICD-10-CM

## 2024-12-13 DIAGNOSIS — M46.1 SI JOINT ARTHRITIS: Primary | ICD-10-CM

## 2024-12-13 DIAGNOSIS — M54.41 CHRONIC RIGHT-SIDED LOW BACK PAIN WITH RIGHT-SIDED SCIATICA: Primary | ICD-10-CM

## 2024-12-13 DIAGNOSIS — G89.29 CHRONIC RIGHT-SIDED LOW BACK PAIN WITH RIGHT-SIDED SCIATICA: Primary | ICD-10-CM

## 2024-12-13 DIAGNOSIS — M54.41 CHRONIC RIGHT-SIDED LOW BACK PAIN WITH RIGHT-SIDED SCIATICA: ICD-10-CM

## 2024-12-13 PROCEDURE — 72100 X-RAY EXAM L-S SPINE 2/3 VWS: CPT

## 2024-12-13 PROCEDURE — 99213 OFFICE O/P EST LOW 20 MIN: CPT | Performed by: NURSE PRACTITIONER

## 2024-12-13 PROCEDURE — 97110 THERAPEUTIC EXERCISES: CPT | Mod: GP

## 2024-12-13 ASSESSMENT — PAIN SCALES - GENERAL
PAINLEVEL_OUTOF10: 2
PAINLEVEL_OUTOF10: 2

## 2024-12-13 ASSESSMENT — ENCOUNTER SYMPTOMS
RESPIRATORY NEGATIVE: 1
NEUROLOGICAL NEGATIVE: 1
ENDOCRINE NEGATIVE: 1
CONSTITUTIONAL NEGATIVE: 1
PSYCHIATRIC NEGATIVE: 1
CARDIOVASCULAR NEGATIVE: 1
HEMATOLOGIC/LYMPHATIC NEGATIVE: 1
ARTHRALGIAS: 1

## 2024-12-13 ASSESSMENT — PAIN - FUNCTIONAL ASSESSMENT: PAIN_FUNCTIONAL_ASSESSMENT: 0-10

## 2024-12-13 NOTE — PROGRESS NOTES
"Physical Therapy Treatment    Patient Name: Erma Castro  MRN: [id number]  : 1960  Today's Date: 2024  Julia Muñoz  Time Calculation  Start Time: 1704  Stop Time: 1750  Time Calculation (min): 46 min      PT Therapeutic Procedures Time Entry  Therapeutic Exercise Time Entry: 45                         General:  General  Reason for Referral: arthritis  Referred By: Toni  General Comment:   Visit #4    Current Problem  Problem List Items Addressed This Visit             ICD-10-CM    Arthritis M19.90    Low back pain - Primary M54.50         Assessment:Patient identity confirmed today with name/. ;  ( 0 ) falls since last clinic visit; modified/added to clinic/HEP today; some R LB/hip discomfort with R SLR and R SKC      Plan:  Treatment/Interventions: Aquatic therapy, Cryotherapy, Dry needling, Education/ Instruction, Electrical stimulation, Gait training, Hot pack, Manual therapy, Mechanical traction, Self care/ home management, Therapeutic exercises, Other (comment) (IASTM/cupping)  PT Plan: Skilled PT  PT Frequency: 2 times per week  Duration: 4wks  Onset Date: 03/15/24  Certification Period Start Date: 24  Certification Period End Date: 25  Rehab Potential: Good  Plan of Care Agreement: Patient  Continue with clinic rehab treatments toward functional goals ( C/C sx centralization)    Subjective: I have  2 /10 pain right now at the  RLB through the (proximal 1/2) of the R thigh).      Precautions  Precautions  Precautions Comment: low fall risk; nbipolar; FM (per patient)      Pain  0-10 (Numeric) Pain Score: 2  Pain Location: Hip  Pain Orientation: Right (proximal 1/2 R thigh)    Objective        There ex:__45___min   Sci Fit L1 6 mins   Hooklying TA set 10 x 3 counts  Hooklying clamshell daniel 2x10 P  Hooklying partial bridge 2x10 N  Hooklying SLR 2x10 ea  SKTC 10x 3\" holds each   Pirif strech 30\" ea  LTR  x10  R iliopsoas stretch 1' (after DTM)  Seated Hadd 2x10 ea N    NOT " "TODAY  Seated alt UE flexion with TA set x10 ea  Rows with TrA and purple  cords  10x   Pull Downs with TrA and teal cords 10x  QL Stretch 5x5\" holds R   Hip Add ISO with play ball 10x 5\" holds   Mini March with lime grn  band 2x10          Manual:  R iliopsoas DTM 3'     IASTM/STM to R side : QL/ paraspinals/glutes /abductors                    HG9 brush j hook frame bevel Up 0-30*    Static Cupping to R; QL glutes X      OP EDUCATION:  Access Code: [email or web address]  URL: [email or web address]  Date: 12/11/2024  Prepared by: Karine Gusman    Exercises  - Seated Single Arm Shoulder Horizontal Abduction and Adduction   - Seated Shoulder Front Raise with Resistance   - Supine Lower Trunk Rotation  - 1 x daily - 7 x weekly - 2 sets - 10 reps  - Supine Single Knee to Chest Stretch  - 1 x daily - 7 x weekly - 1 sets - 10 reps - 5\" hold  - Supine Quadratus Lumborum Stretch  - 1 x daily - 7 x weekly - 1 sets - 5 reps - 5\" hold  - Modified Con Stretch  - 1 x daily - 7 x weekly - 1 sets - 5 reps - 5\" hold  - Supine Hip Adduction Isometric with Ball  - 1 x daily - 7 x weekly - 2 sets - 10 reps - 4\" hold  - Hooklying Clamshell with Resistance  - 1 x daily - 7 x weekly - 2 sets - 10 reps  - Supine March  - 1 x daily - 7 x weekly - 2 sets - 10 reps  - Supine Transversus Abdominis Bracing - Hands on Stomach  - 1 x daily - 7 x weekly - 3 sets - 10 reps - 4\" hold      OP EDUCATION:  Access Code: [email or web address]  URL: [email or web address]  Date: 12/13/2024  Prepared by: Terrence Connolly    Exercises  - Bridge   - Seated Single Arm Shoulder Horizontal Abduction and Adduction     Goals:  Active       PT Problem       PT Goal 1       Start:  11/26/24    Expected End:  02/24/25       1. Independent HEP to allow for 50% reduction in max ADL C/C sx ( 4/10) 2-3wks  2. 0/10 night time sx to allow for uninterrupted sleep x 1wk ( 7/7) 2-3wks  3. Survey score improvement from 46% to 35% (KELLY) 3-4wks  4. Strength " "increase to allow for improved ADL stdg/walking (from 4-/5 to 4+/5 abdominals) 3-4wks  5. ROM increase to allow for improved ADL car transfers (from 75% to 100% SFB) 3-4wks         PT Goal 2       Start:  11/26/24    Expected End:  02/24/25       1. \"I want my  back/hip/thigh  to feel better\".             "

## 2024-12-13 NOTE — PROGRESS NOTES
Subjective    Patient ID: Erma Castro is a 64 y.o. female.    Chief Complaint: Pain and Follow-up of the Lower Back (Xrays 5/3/24) and Pain and Follow-up of the Right Hip (Xrays 5/3/24)      Spine    Erma is a pleasant 64-year-old female here for FUV of low back pain and into posterior right hip.  Symptoms started in March 2024 while assisting her spouse with frequent on and off the ground type activities with ADLs after her spouse became injured.  PT is helping improve the symptoms, reports massage at PT really helped.  Aggravated with increased activity including standing, prolonged sitting, sitting on firm chairs causes symptoms to radiate around towards the groin.  Patient does have a history of fibromyalgia but feels these symptoms are different.  No numbness to groin or lower extremities, no loss of bowel or bladder control.  Meloxicam and Tylenol arthritis help with symptoms, no topicals attempted.  Has not initiated home exercises.    Review of Systems   Constitutional: Negative.    HENT: Negative.     Respiratory: Negative.     Cardiovascular: Negative.    Endocrine: Negative.    Musculoskeletal:  Positive for arthralgias.   Skin: Negative.    Neurological: Negative.    Hematological: Negative.    Psychiatric/Behavioral: Negative.        Objective   Right Hip Exam     Tenderness   The patient is experiencing tenderness in the anterior and posterior.    Range of Motion   Abduction:  normal   Adduction:  normal   Flexion:  120   External rotation:  60 normal     Tests   TRINITY: negative    Other   Erythema: absent  Sensation: normal  Pulse: present    Comments:  Negative logroll  Negative straight leg raise  Full ROM of distal joints with no sx aggravation  Distal motor and sensory intact, cap refill at 2 seconds.      Back Exam     Tenderness   The patient is experiencing tenderness in the sacroiliac and lumbar.    Range of Motion   Extension:  normal   Flexion:  80   Lateral bend right:  normal   Lateral  bend left:  70   Rotation right:  70   Rotation left:  normal     Tests   Straight leg raise right: negative  Straight leg raise left: negative    Other   Sensation: normal  Gait: normal     Comments:  Mild right side lower extremity weakness compared to the opposite.  Symptoms aggravated with single-leg stand.  Positive pain with range of motion affecting right SI joint as well as palpation.  Distal motor and sensory intact, cap refill at 2 seconds.          Reviewed PT eval and most recent note    Image Results:  XR hip right with pelvis when performed 2 or 3 views  Narrative: Interpreted By:  Samia Heard,   STUDY:  Single view pelvis.  Right hip, two views.      INDICATION:  Signs/Symptoms:hip pain.      COMPARISON:  None.      ACCESSION NUMBER(S):  [id number]      ORDERING CLINICIAN:  ZAFAR AGUIRRE      FINDINGS:  No acute fracture or malalignment.  Bilateral hip joint spaces are well preserved.  Small calcification inferior to the right ischial tuberosity which  may represent calcific tendinosis within the hamstring origin.  Advanced degenerative changes of the partially visualized lumbar  spine with disc height loss and osteophytes. Soft tissues are within  normal limits.      Impression: 1. Advanced degenerative changes of the partially visualized lumbar  spine  2. Small calcification inferior to the right ischial tuberosity which  may represent calcific tendinosis within the hamstring origin.      MACRO:  None.      Signed by: Samia Heard 5/5/2024 5:07 PM  Dictation workstation:   [id number]        Assessment/Plan   Encounter Diagnoses:    Problem List Items Addressed This Visit             ICD-10-CM    Chronic right-sided low back pain with right-sided sciatica M54.41, G89.29     We discussed symptom control with previously prescribed meloxicam once daily with food.  Reviewed Tylenol arthritis with max dosing 3 doses per day per package directions.  We reviewed topical OTC formulas including  lidocaine patches to use 12 hours on 12 hours off, if this is not effective she continue with PT as scheduled as well as home exercises  We reviewed red flag symptoms for urgent evaluation in the ED for back pain including loss of bowel or bladder control, saddle anesthesia, progressive paresthesias, stumbles, falls or other concerns.   Outpatient lumbar x-ray ordered  For follow-up here with Dr. Mohr approximately 1 month, sooner for changes or concerns.  In agreement with plan of care  This note was generated using Dragon software.  It may contain errors in wording, punctuation or spelling.         Relevant Orders    Referral to Pain Management    SI joint arthritis - Primary M46.1    Relevant Orders    Referral to Pain Management    Follow Up In Orthopaedic Surgery     Other Visit Diagnoses         Codes    Lumbar pain     M54.50    Relevant Orders    XR lumbar spine 2-3 views (Completed)

## 2024-12-17 ENCOUNTER — TREATMENT (OUTPATIENT)
Dept: PHYSICAL THERAPY | Facility: CLINIC | Age: 64
End: 2024-12-17
Payer: COMMERCIAL

## 2024-12-17 DIAGNOSIS — M54.41 CHRONIC RIGHT-SIDED LOW BACK PAIN WITH RIGHT-SIDED SCIATICA: Primary | ICD-10-CM

## 2024-12-17 DIAGNOSIS — M19.90 ARTHRITIS: ICD-10-CM

## 2024-12-17 DIAGNOSIS — G89.29 CHRONIC RIGHT-SIDED LOW BACK PAIN WITH RIGHT-SIDED SCIATICA: Primary | ICD-10-CM

## 2024-12-17 DIAGNOSIS — S76.011A HIP STRAIN, RIGHT, INITIAL ENCOUNTER: ICD-10-CM

## 2024-12-17 PROCEDURE — 97110 THERAPEUTIC EXERCISES: CPT | Mod: GP,CQ

## 2024-12-17 PROCEDURE — RXMED WILLOW AMBULATORY MEDICATION CHARGE

## 2024-12-17 RX ORDER — MELOXICAM 15 MG/1
15 TABLET ORAL DAILY
Qty: 30 TABLET | Refills: 3 | Status: SHIPPED | OUTPATIENT
Start: 2024-12-17

## 2024-12-17 ASSESSMENT — PAIN SCALES - GENERAL
PAINLEVEL_OUTOF10: 2
PAINLEVEL_OUTOF10: 2

## 2024-12-17 ASSESSMENT — PAIN - FUNCTIONAL ASSESSMENT: PAIN_FUNCTIONAL_ASSESSMENT: 0-10

## 2024-12-17 NOTE — PROGRESS NOTES
Physical Therapy Treatment    Patient Name: Erma Castro  MRN: [id number]  Today's Date: 12/17/2024  Time Calculation  Start Time: 1446  Stop Time: 1528  Time Calculation (min): 42 min  PT Therapeutic Procedures Time Entry  Therapeutic Exercise Time Entry: 40              Current Problem  Problem List Items Addressed This Visit             ICD-10-CM       Musculoskeletal and Injuries    Arthritis M19.90    Low back pain - Primary M54.50         Subjective She reports mild pain 2.10 in both R hip and LB. Notes pain does increase with lifting overhead or bending over to reach items off chair/floor.   HEP:Compliant daily with HEP.  General  Reason for Referral: arthritis  Referred By: Toni  General Comment: 5/9  Precautions  Precautions  Precautions Comment: low fall risk; nbipolar; FM (per patient)  Pain  Pain Assessment: 0-10  0-10 (Numeric) Pain Score: 2  Pain Location: Hip  Pain Orientation: Right  Multiple Pain Sites: Two  Objective:    Assessment:Patient identified by name and birth date. This session completed core and LE PREs with minimal c/o. She demo's good form and speed . Instructed in self massage techniques with Thera- Cane. She voiced good understanding .      Plan:Continue with core exercises  and LE PREs  to allow  improved trunk stability for ease with reaching and bending without reports of increased LBP.-CG.       OP PT Plan  Treatment/Interventions: Aquatic therapy, Cryotherapy, Dry needling, Education/ Instruction, Electrical stimulation, Gait training, Hot pack, Manual therapy, Mechanical traction, Self care/ home management, Therapeutic exercises, Other (comment) (IASTM/cupping)  PT Plan: Skilled PT  PT Frequency: 2 times per week  Duration: 4wks  Onset Date: 03/15/24  Certification Period Start Date: 11/26/24  Certification Period End Date: 02/24/25  Rehab Potential: Good  Plan of Care Agreement: Patient    Treatments:   There ex:_   Sci Fit L1 6 mins   Hooklying TA set 10 x 3 counts  Hooklying  "clamshell daniel 2x10   Mini Marches with light blue band 2x10   Hooklying partial bridge 2x10   Hooklying SLR 2x10 ea  SKTC 10x 3\" holds each   Pirif strech 30\" ea  LTR  x10  R iliopsoas stretch 1'   Seated Hip add 2x10 ea   Seated alt UE flexion with TA set x10 ea  Rows with TrA and purple  cords  2x10   Pull Downs with TrA and teal cords 2x10   QL Stretch 5x5\" holds R   Hip Add ISO with play ball 10x 5\" holds   Mini March with lime grn  band 2x10  HEP: thera-cane self massage techniques instructed (N)           Manual: (X)  R iliopsoas DTM (X)      IASTM/STM to R side : QL/ paraspinals/glutes /abductors                    HG9 brush j hook frame bevel Up 0-30*(X)     Static Cupping to R; QL glutes X      OP EDUCATION:   Access Code: [email or web address]  URL: [email or web address]  Date: 12/11/2024  Prepared by: Karine Gusman     Exercises  - Seated Single Arm Shoulder Horizontal Abduction and Adduction   - Seated Shoulder Front Raise with Resistance   - Supine Lower Trunk Rotation  - 1 x daily - 7 x weekly - 2 sets - 10 reps  - Supine Single Knee to Chest Stretch  - 1 x daily - 7 x weekly - 1 sets - 10 reps - 5\" hold  - Supine Quadratus Lumborum Stretch  - 1 x daily - 7 x weekly - 1 sets - 5 reps - 5\" hold  - Modified Con Stretch  - 1 x daily - 7 x weekly - 1 sets - 5 reps - 5\" hold  - Supine Hip Adduction Isometric with Ball  - 1 x daily - 7 x weekly - 2 sets - 10 reps - 4\" hold  - Hooklying Clamshell with Resistance  - 1 x daily - 7 x weekly - 2 sets - 10 reps  - Supine March  - 1 x daily - 7 x weekly - 2 sets - 10 reps  - Supine Transversus Abdominis Bracing - Hands on Stomach  - 1 x daily - 7 x weekly - 3 sets - 10 reps - 4\" hold        OP EDUCATION:  Access Code: [email or web address]  URL: [email or web address]  Date: 12/13/2024  Prepared by: Terrecne Connolly     Exercises  - Bridge   - Seated Single Arm Shoulder Horizontal Abduction and Adduction     Goals:  Active       PT Problem       " "PT Goal 1       Start:  11/26/24    Expected End:  02/24/25       1. Independent HEP to allow for 50% reduction in max ADL C/C sx ( 4/10) 2-3wks  2. 0/10 night time sx to allow for uninterrupted sleep x 1wk ( 7/7) 2-3wks  3. Survey score improvement from 46% to 35% (KELLY) 3-4wks  4. Strength increase to allow for improved ADL stdg/walking (from 4-/5 to 4+/5 abdominals) 3-4wks  5. ROM increase to allow for improved ADL car transfers (from 75% to 100% SFB) 3-4wks         PT Goal 2       Start:  11/26/24    Expected End:  02/24/25       1. \"I want my  back/hip/thigh  to feel better\".             "

## 2024-12-19 ENCOUNTER — PHARMACY VISIT (OUTPATIENT)
Dept: PHARMACY | Facility: CLINIC | Age: 64
End: 2024-12-19
Payer: COMMERCIAL

## 2024-12-19 ENCOUNTER — TREATMENT (OUTPATIENT)
Dept: PHYSICAL THERAPY | Facility: CLINIC | Age: 64
End: 2024-12-19
Payer: COMMERCIAL

## 2024-12-19 DIAGNOSIS — M19.90 ARTHRITIS: ICD-10-CM

## 2024-12-19 DIAGNOSIS — M54.41 CHRONIC RIGHT-SIDED LOW BACK PAIN WITH RIGHT-SIDED SCIATICA: Primary | ICD-10-CM

## 2024-12-19 DIAGNOSIS — G89.29 CHRONIC RIGHT-SIDED LOW BACK PAIN WITH RIGHT-SIDED SCIATICA: Primary | ICD-10-CM

## 2024-12-19 PROCEDURE — 97110 THERAPEUTIC EXERCISES: CPT | Mod: GP,CQ

## 2024-12-19 PROCEDURE — 97140 MANUAL THERAPY 1/> REGIONS: CPT | Mod: GP,CQ

## 2024-12-19 ASSESSMENT — PAIN SCALES - GENERAL
PAINLEVEL_OUTOF10: 3
PAINLEVEL_OUTOF10: 2

## 2024-12-19 ASSESSMENT — PAIN - FUNCTIONAL ASSESSMENT: PAIN_FUNCTIONAL_ASSESSMENT: 0-10

## 2024-12-19 NOTE — PROGRESS NOTES
Physical Therapy Treatment    Patient Name: Erma Castro  MRN: [id number]  Today's Date: 12/19/2024  Time Calculation  Start Time: 1451  Stop Time: 1531  Time Calculation (min): 40 min  PT Therapeutic Procedures Time Entry  Manual Therapy Time Entry: 10  Therapeutic Exercise Time Entry: 29              Current Problem  Problem List Items Addressed This Visit             ICD-10-CM       Musculoskeletal and Injuries    Arthritis M19.90    Low back pain - Primary M54.50         Subjective States pain increased with transfer out of car and riding long time frames in car. Rates her LBP 3-4/10. Notes R side tightness in glut/LB.   HEP:  Compliant  1/week with HEP.  General  Reason for Referral: arthritis  Referred By: Toni  General Comment: 6/9  Precautions  Precautions  Precautions Comment: low fall risk; nbipolar; FM (per patient)  Pain  Pain Assessment: 0-10  0-10 (Numeric) Pain Score: 2  Pain Location: Hip  Pain Orientation: Right  Objective:    Assessment:Patient identified by name and birth date. Cupping /STM completed to reduce pain and soft tissue restriction in R LB and hip musculature. After manual work she noted decreased pain and tightness.     Plan: Continue with manual therapy to reduce soft tissue restriction / pain and core strengthening to allow  improved ease with functional transfers and riding in car without reports of increased pain.-CG.     OP PT Plan  Treatment/Interventions: Aquatic therapy, Cryotherapy, Dry needling, Education/ Instruction, Electrical stimulation, Gait training, Hot pack, Manual therapy, Mechanical traction, Self care/ home management, Therapeutic exercises, Other (comment) (IASTM/cupping)  PT Plan: Skilled PT  PT Frequency: 2 times per week  Duration: 4wks  Onset Date: 03/15/24  Certification Period Start Date: 11/26/24  Certification Period End Date: 02/24/25  Rehab Potential: Good  Plan of Care Agreement: Patient          Treatments:   There ex:_   Sci Fit L1 6 mins   Hooklying  "TA set 10 x 3 counts  Hooklying clamshell  light blue band  2x10   Mini Marches with light blue band 2x10   Hooklying partial bridge 2x10   Hooklying SLR 2x10 ea  SKTC 10x 3\" holds each   Pirif strech 30\" ea(X)  LTR  x10  R iliopsoas stretch 1'   Seated Hip add 2x10 ea   Seated alt UE flexion with TA set x10 ea  Rows with TrA and purple  cords  2x10   Pull Downs with TrA and teal cords 2x10   QL Stretch 5x5\" holds R   Hip Add ISO with play ball 10x 5\" holds   Mini March with lime grn  band 2x10  HEP:            Manual:   R iliopsoas DTM (X)      IASTM/STM to R side : QL/ paraspinals/glutes /abductors                    HG9 brush j hook frame bevel Up 0-30*(X)     Static Cupping to R; QL glutes   STM to R glutes QL paraspinals     OP EDUCATION:   Access Code: [email or web address]  URL: [email or web address]  Date: 12/11/2024  Prepared by: Karine Gusman     Exercises  - Seated Single Arm Shoulder Horizontal Abduction and Adduction   - Seated Shoulder Front Raise with Resistance   - Supine Lower Trunk Rotation  - 1 x daily - 7 x weekly - 2 sets - 10 reps  - Supine Single Knee to Chest Stretch  - 1 x daily - 7 x weekly - 1 sets - 10 reps - 5\" hold  - Supine Quadratus Lumborum Stretch  - 1 x daily - 7 x weekly - 1 sets - 5 reps - 5\" hold  - Modified Con Stretch  - 1 x daily - 7 x weekly - 1 sets - 5 reps - 5\" hold  - Supine Hip Adduction Isometric with Ball  - 1 x daily - 7 x weekly - 2 sets - 10 reps - 4\" hold  - Hooklying Clamshell with Resistance  - 1 x daily - 7 x weekly - 2 sets - 10 reps  - Supine March  - 1 x daily - 7 x weekly - 2 sets - 10 reps  - Supine Transversus Abdominis Bracing - Hands on Stomach  - 1 x daily - 7 x weekly - 3 sets - 10 reps - 4\" hold        OP EDUCATION:  Access Code: [email or web address]  URL: [email or web address]  Date: 12/13/2024  Prepared by: Terrence Connolly     Exercises  - Bridge   - Seated Single Arm Shoulder Horizontal Abduction and Adduction         " "      Goals:  Active       PT Problem       PT Goal 1       Start:  11/26/24    Expected End:  02/24/25       1. Independent HEP to allow for 50% reduction in max ADL C/C sx ( 4/10) 2-3wks  2. 0/10 night time sx to allow for uninterrupted sleep x 1wk ( 7/7) 2-3wks  3. Survey score improvement from 46% to 35% (KELLY) 3-4wks  4. Strength increase to allow for improved ADL stdg/walking (from 4-/5 to 4+/5 abdominals) 3-4wks  5. ROM increase to allow for improved ADL car transfers (from 75% to 100% SFB) 3-4wks         PT Goal 2       Start:  11/26/24    Expected End:  02/24/25       1. \"I want my  back/hip/thigh  to feel better\".             "

## 2024-12-19 NOTE — ASSESSMENT & PLAN NOTE
We discussed symptom control with previously prescribed meloxicam once daily with food.  Reviewed Tylenol arthritis with max dosing 3 doses per day per package directions.  We reviewed topical OTC formulas including lidocaine patches to use 12 hours on 12 hours off, if this is not effective she continue with PT as scheduled as well as home exercises  We reviewed red flag symptoms for urgent evaluation in the ED for back pain including loss of bowel or bladder control, saddle anesthesia, progressive paresthesias, stumbles, falls or other concerns.   Outpatient lumbar x-ray ordered  For follow-up here with Dr. Mohr approximately 1 month, sooner for changes or concerns.  In agreement with plan of care  This note was generated using Dragon software.  It may contain errors in wording, punctuation or spelling.

## 2024-12-23 ENCOUNTER — TREATMENT (OUTPATIENT)
Dept: PHYSICAL THERAPY | Facility: CLINIC | Age: 64
End: 2024-12-23
Payer: COMMERCIAL

## 2024-12-23 DIAGNOSIS — G89.29 CHRONIC RIGHT-SIDED LOW BACK PAIN WITH RIGHT-SIDED SCIATICA: Primary | ICD-10-CM

## 2024-12-23 DIAGNOSIS — M19.90 ARTHRITIS: ICD-10-CM

## 2024-12-23 DIAGNOSIS — M54.41 CHRONIC RIGHT-SIDED LOW BACK PAIN WITH RIGHT-SIDED SCIATICA: Primary | ICD-10-CM

## 2024-12-23 PROCEDURE — 97110 THERAPEUTIC EXERCISES: CPT | Mod: GP

## 2024-12-23 ASSESSMENT — PAIN SCALES - GENERAL: PAINLEVEL_OUTOF10: 3

## 2024-12-23 ASSESSMENT — PAIN - FUNCTIONAL ASSESSMENT: PAIN_FUNCTIONAL_ASSESSMENT: 0-10

## 2024-12-23 NOTE — PROGRESS NOTES
Physical Therapy Treatment    Patient Name: Erma Castro  MRN: [id number]  : 1960  Today's Date: 2024  Julia Muñoz  Time Calculation  Start Time: 1507  Stop Time: 1555  Time Calculation (min): 48 min      PT Therapeutic Procedures Time Entry  Therapeutic Exercise Time Entry: 42                         General:  General  Reason for Referral: arthritis  Referred By: Toni  General Comment:   Visit #7    Current Problem  Problem List Items Addressed This Visit             ICD-10-CM    Arthritis M19.90    Low back pain - Primary M54.50         Assessment:Patient identity confirmed today with name/. ;  ( 0 ) falls since last clinic visit; altered  due to L forearm discomfort ; negative palpable difference in distal biceps contours although there is local tenderness; reduced R iliopsoas popping after DTM today      Plan:  Treatment/Interventions: Aquatic therapy, Cryotherapy, Dry needling, Education/ Instruction, Electrical stimulation, Gait training, Hot pack, Manual therapy, Mechanical traction, Self care/ home management, Therapeutic exercises, Other (comment) (IASTM/cupping)  PT Plan: Skilled PT  PT Frequency: 2 times per week  Duration: 4wks  Onset Date: 03/15/24  Certification Period Start Date: 24  Certification Period End Date: 25  Rehab Potential: Good  Plan of Care Agreement: Patient  Continue with clinic rehab treatments toward functional goals ( gait)    Subjective: I have   3/10 pain right now.   I did have some pain in my L forearm when handling things in the trunk of my car.  I did get my exercises done though.  The R (iliopsoas DTM) felt good for awhile but the hip popping returned.      Precautions  Precautions  Precautions Comment: low fall risk; nbipolar; FM (per patient)      Pain  Pain Assessment: 0-10  0-10 (Numeric) Pain Score: 3  Pain Location: Back  Pain Orientation: Lower (into the R hip also)    Objective    Manual:__0___min    There ex:_41____min  Sci Fit  "L1  mins (LE only-due to L forearm discomfort)  Hooklying TA set 10 x 3 counts  Mini Marches with TA set 2x10 (no band today)  Hooklying clamshell  light blue band  2x10   Hooklying hip Add ISO with play ball 10x 3\" holds   Hooklying partial bridge 2x10   Hooklying SLR 1# 2x10 ea P  SKTC 10x 3\" holds each   Pirif strech 30\" ea  LTR  x10  R iliopsoas stretch 1'  Seated H add 2x10 ea  purple tub (attempted  with BUE  today but stopped due to LUE pain)  Seated alt UE flexion with TA set x10 ea teal tub (attempted with B  today but stopped due to LUE pain)  Pull Downs with TrA and teal cords 2x10 -not attempted due to LUE pain  Hip ext OET 2x10 ea A  NOT TODAY  Rows with TrA and purple  cords  2x10 X  QL Stretch 5x5\" holds R  X  Hip Add ISO with play ball 10x 5\" holds   Mini March with lime grn  band 2x10 X         Manual: 2'   R iliopsoas DTM       IASTM/STM to R side : QL/ paraspinals/glutes /abductors                    HG9 brush j hook frame bevel Up 0-30*(X)     Static Cupping to R; QL glutes X  STM to R glutes QL paraspinals X    OP EDUCATION:   Access Code: [email or web address]  URL: [email or web address]  Date: 12/11/2024  Prepared by: Karine Gusman     Exercises  - Seated Single Arm Shoulder Horizontal Abduction and Adduction   - Seated Shoulder Front Raise with Resistance   - Supine Lower Trunk Rotation  - 1 x daily - 7 x weekly - 2 sets - 10 reps  - Supine Single Knee to Chest Stretch  - 1 x daily - 7 x weekly - 1 sets - 10 reps - 5\" hold  - Supine Quadratus Lumborum Stretch  - 1 x daily - 7 x weekly - 1 sets - 5 reps - 5\" hold  - Modified Con Stretch  - 1 x daily - 7 x weekly - 1 sets - 5 reps - 5\" hold  - Supine Hip Adduction Isometric with Ball  - 1 x daily - 7 x weekly - 2 sets - 10 reps - 4\" hold  - Hooklying Clamshell with Resistance  - 1 x daily - 7 x weekly - 2 sets - 10 reps  - Supine March  - 1 x daily - 7 x weekly - 2 sets - 10 reps  - Supine Transversus Abdominis Bracing - " "Hands on Stomach  - 1 x daily - 7 x weekly - 3 sets - 10 reps - 4\" hold        OP EDUCATION:  Access Code: [email or web address]  URL: [email or web address]  Date: 12/13/2024  Prepared by: Terrence Connolly     Exercises  - Bridge   - Seated Single Arm Shoulder Horizontal Abduction and Adduction              Goals:  Active       PT Problem       PT Goal 1       Start:  11/26/24    Expected End:  02/24/25       1. Independent HEP to allow for 50% reduction in max ADL C/C sx ( 4/10) 2-3wks  2. 0/10 night time sx to allow for uninterrupted sleep x 1wk ( 7/7) 2-3wks  3. Survey score improvement from 46% to 35% (KELLY) 3-4wks  4. Strength increase to allow for improved ADL stdg/walking (from 4-/5 to 4+/5 abdominals) 3-4wks  5. ROM increase to allow for improved ADL car transfers (from 75% to 100% SFB) 3-4wks         PT Goal 2       Start:  11/26/24    Expected End:  02/24/25       1. \"I want my  back/hip/thigh  to feel better\".             "

## 2024-12-26 ENCOUNTER — TREATMENT (OUTPATIENT)
Dept: PHYSICAL THERAPY | Facility: CLINIC | Age: 64
End: 2024-12-26
Payer: COMMERCIAL

## 2024-12-26 DIAGNOSIS — M54.41 CHRONIC RIGHT-SIDED LOW BACK PAIN WITH RIGHT-SIDED SCIATICA: Primary | ICD-10-CM

## 2024-12-26 DIAGNOSIS — G89.29 CHRONIC RIGHT-SIDED LOW BACK PAIN WITH RIGHT-SIDED SCIATICA: Primary | ICD-10-CM

## 2024-12-26 DIAGNOSIS — M19.90 ARTHRITIS: ICD-10-CM

## 2024-12-26 PROCEDURE — 97110 THERAPEUTIC EXERCISES: CPT | Mod: GP,CQ

## 2024-12-26 ASSESSMENT — PAIN - FUNCTIONAL ASSESSMENT: PAIN_FUNCTIONAL_ASSESSMENT: 0-10

## 2024-12-26 ASSESSMENT — PAIN SCALES - GENERAL: PAINLEVEL_OUTOF10: 3

## 2024-12-26 NOTE — PROGRESS NOTES
"Physical Therapy Treatment    Patient Name: Erma Castro  MRN: [id number]  Today's Date: 12/26/2024  Time Calculation  Start Time: 1452  Stop Time: 1530  Time Calculation (min): 38 min  PT Therapeutic Procedures Time Entry  Therapeutic Exercise Time Entry: 36              Current Problem  Problem List Items Addressed This Visit             ICD-10-CM       Musculoskeletal and Injuries    Arthritis M19.90    Low back pain - Primary M54.50         Subjective She c/o 3/10 LBP . States she was racking and sitting on stool to  leaves. She notes ease with STS now without as much pain.   HEP: Compliant with HEP   General  Reason for Referral: arthritis  Referred By: Toni  General Comment: 8/9  Precautions  Precautions  Precautions Comment: low fall risk; nbipolar; FM (per patient)  Pain  Pain Assessment: 0-10  0-10 (Numeric) Pain Score: 3  Pain Location: Back  Pain Orientation: Lower  Objective:    Assessment: Late to session by 7 mins.  Patient identified by name and birth date. This session held T band exercises secondary to L UE pain. She completed all core stability exercises with minimal c/o.     Plan: Patient to see physical therapist for reassessment next session. -CG    OP PT Plan  Treatment/Interventions: Aquatic therapy, Cryotherapy, Dry needling, Education/ Instruction, Electrical stimulation, Gait training, Hot pack, Manual therapy, Mechanical traction, Self care/ home management, Therapeutic exercises, Other (comment) (IASTM/cupping)  PT Plan: Skilled PT  PT Frequency: 2 times per week  Duration: 4wks  Onset Date: 03/15/24  Certification Period Start Date: 11/26/24  Certification Period End Date: 02/24/25  Rehab Potential: Good  Plan of Care Agreement: Patient          Treatments:   There ex:_  Sci Fit L1  mins (LE only-due to L forearm discomfort)  Hooklying TA set 10 x 3 counts  Mini Marches with TA set 2x10  Hooklying clamshell  light blue band  2x10   Hooklying hip Add ISO with play ball 10x 3\" holds " "  Hooklying partial bridge 2x10   Hooklying SLR 1# 2x10 ea   SKTC 10x 3\" holds each   Hip ext OET 2x10 ea (N)  Pirif strech 30\" ea  QL Stretch 5x5\" holds R  X  LTR  x10  R iliopsoas stretch 1'    Not Today due to L UE pain   Seated  shoulder H add 2x10 (X)  Seated alt UE flexion with TA set x10 ea teal tube (X)  Pull Downs with TrA and teal cords 2x10 -(X)  Rows with TrA and purple  cords  2x10 X      Manual:  R iliopsoas DTM       IASTM/STM to R side : QL/ paraspinals/glutes /abductors                    HG9 brush j hook frame bevel Up 0-30*(X)     Static Cupping to R; QL glutes X  STM to R glutes QL paraspinals X      OP EDUCATION:    Access Code: [email or web address]  URL: [email or web address]  Date: 12/11/2024  Prepared by: Karine Gusman     Exercises  - Seated Single Arm Shoulder Horizontal Abduction and Adduction   - Seated Shoulder Front Raise with Resistance   - Supine Lower Trunk Rotation  - 1 x daily - 7 x weekly - 2 sets - 10 reps  - Supine Single Knee to Chest Stretch  - 1 x daily - 7 x weekly - 1 sets - 10 reps - 5\" hold  - Supine Quadratus Lumborum Stretch  - 1 x daily - 7 x weekly - 1 sets - 5 reps - 5\" hold  - Modified Con Stretch  - 1 x daily - 7 x weekly - 1 sets - 5 reps - 5\" hold  - Supine Hip Adduction Isometric with Ball  - 1 x daily - 7 x weekly - 2 sets - 10 reps - 4\" hold  - Hooklying Clamshell with Resistance  - 1 x daily - 7 x weekly - 2 sets - 10 reps  - Supine March  - 1 x daily - 7 x weekly - 2 sets - 10 reps  - Supine Transversus Abdominis Bracing - Hands on Stomach  - 1 x daily - 7 x weekly - 3 sets - 10 reps - 4\" hold    Goals:  Active       PT Problem       PT Goal 1       Start:  11/26/24    Expected End:  02/24/25       1. Independent HEP to allow for 50% reduction in max ADL C/C sx ( 4/10) 2-3wks  2. 0/10 night time sx to allow for uninterrupted sleep x 1wk ( 7/7) 2-3wks  3. Survey score improvement from 46% to 35% (KELLY) 3-4wks  4. Strength increase to allow for " "improved ADL stdg/walking (from 4-/5 to 4+/5 abdominals) 3-4wks  5. ROM increase to allow for improved ADL car transfers (from 75% to 100% SFB) 3-4wks         PT Goal 2       Start:  11/26/24    Expected End:  02/24/25       1. \"I want my  back/hip/thigh  to feel better\".             "

## 2024-12-30 ENCOUNTER — TREATMENT (OUTPATIENT)
Dept: PHYSICAL THERAPY | Facility: CLINIC | Age: 64
End: 2024-12-30
Payer: COMMERCIAL

## 2024-12-30 ENCOUNTER — PHARMACY VISIT (OUTPATIENT)
Dept: PHARMACY | Facility: CLINIC | Age: 64
End: 2024-12-30
Payer: COMMERCIAL

## 2024-12-30 DIAGNOSIS — M19.90 ARTHRITIS: ICD-10-CM

## 2024-12-30 DIAGNOSIS — G89.29 CHRONIC RIGHT-SIDED LOW BACK PAIN WITH RIGHT-SIDED SCIATICA: Primary | ICD-10-CM

## 2024-12-30 DIAGNOSIS — M54.41 CHRONIC RIGHT-SIDED LOW BACK PAIN WITH RIGHT-SIDED SCIATICA: Primary | ICD-10-CM

## 2024-12-30 PROCEDURE — 97110 THERAPEUTIC EXERCISES: CPT | Mod: GP

## 2024-12-30 PROCEDURE — RXMED WILLOW AMBULATORY MEDICATION CHARGE

## 2024-12-30 RX ORDER — LAMOTRIGINE 150 MG/1
300 TABLET ORAL NIGHTLY
Qty: 60 TABLET | Refills: 1 | OUTPATIENT
Start: 2024-12-30

## 2024-12-30 ASSESSMENT — PAIN SCALES - GENERAL: PAINLEVEL_OUTOF10: 4

## 2024-12-30 ASSESSMENT — PAIN - FUNCTIONAL ASSESSMENT: PAIN_FUNCTIONAL_ASSESSMENT: 0-10

## 2024-12-30 NOTE — PROGRESS NOTES
Physical Therapy Treatment    Patient Name: Erma Castro  MRN: [id number]  : 1960  Today's Date: 2024  Julia Muñoz  Time Calculation  Start Time: 112                                General:  General  Reason for Referral: arthritis  Referred By: Toni  General Comment:   Visit #9    Current Problem  Problem List Items Addressed This Visit             ICD-10-CM    Arthritis M19.90    Low back pain - Primary M54.50         Assessment:Patient identity confirmed today with name/. ;  ( 0 ) falls since last clinic visit; see goals; one exercise was addressed and clarified today      Plan:  Treatment/Interventions: Aquatic therapy, Cryotherapy, Dry needling, Education/ Instruction, Electrical stimulation, Gait training, Hot pack, Manual therapy, Mechanical traction, Self care/ home management, Therapeutic exercises, Other (comment) (IASTM/cupping)  PT Plan: Skilled PT  PT Frequency: 2 times per week  Duration: 4wks  Onset Date: 03/15/24  Certification Period Start Date: 24  Certification Period End Date: 25  Rehab Potential: Good  Plan of Care Agreement: Patient  The patient has agreed to trial pool unloading and core stability treatments    Subjective: I have  4 /10 pain right now.         Precautions  Precautions  Precautions Comment: low fall risk; nbipolar; FM (per patient)      Pain  Pain Assessment: 0-10  0-10 (Numeric) Pain Score: 4  Pain Location: Back  Pain Orientation: Lower  Pain Radiating Towards: R hip and R thigh    Objective  See goals; seemingly inconsistent progress of C/C sx at the RLB and hip with continued RLE radiation;  the patient also comments that ADL responsibilities continue to contribute to her C/C sx; trunk ROM/flexibility/core stability and STW have been included in the program     Manual:___0__min    There ex:__35___min  Goals reviewed, surveyed and/or updated  Reviewed and/or modified ongoing HEP today.  DTW R iliopsoas  R iliopsoas stretch 1'  NOT  "TODAY  Sci Fit L1  mins (LE only-due to L forearm discomfort)  Hooklying TA set 10 x 3 counts  Mini Marches with TA set 2x10  Hooklying clamshell  light blue band  2x10   Hooklying hip Add ISO with play ball 10x 3\" holds   Hooklying partial bridge 2x10   Hooklying SLR 1# 2x10 ea   SKTC 10x 3\" holds each   Hip ext OET 2x10 ea (N)  Pirif strech 30\" ea  QL Stretch 5x5\" holds R  X  LTR  x10  R iliopsoas stretch 1'  Seated  shoulder H add 2x10 (X)  Seated alt UE flexion with TA set x10 ea teal tube (X)  Pull Downs with TrA and teal cords 2x10 -(X)  Rows with TrA and purple  cords  2x10 X      Manual:  R iliopsoas DTM       IASTM/STM to R side : QL/ paraspinals/glutes /abductors                    HG9 brush j hook frame bevel Up 0-30*(X)     Static Cupping to R; QL glutes X  STM to R glutes QL paraspinals X      OP EDUCATION:    Access Code: [email or web address]  URL: [email or web address]  Date: 12/11/2024  Prepared by: Karine Gusman     Exercises  - Seated Single Arm Shoulder Horizontal Abduction and Adduction   - Seated Shoulder Front Raise with Resistance   - Supine Lower Trunk Rotation  - 1 x daily - 7 x weekly - 2 sets - 10 reps  - Supine Single Knee to Chest Stretch  - 1 x daily - 7 x weekly - 1 sets - 10 reps - 5\" hold  - Supine Quadratus Lumborum Stretch  - 1 x daily - 7 x weekly - 1 sets - 5 reps - 5\" hold  - Modified Con Stretch  - 1 x daily - 7 x weekly - 1 sets - 5 reps - 5\" hold  - Supine Hip Adduction Isometric with Ball  - 1 x daily - 7 x weekly - 2 sets - 10 reps - 4\" hold  - Hooklying Clamshell with Resistance  - 1 x daily - 7 x weekly - 2 sets - 10 reps  - Supine March  - 1 x daily - 7 x weekly - 2 sets - 10 reps  - Supine Transversus Abdominis Bracing - Hands on Stomach  - 1 x daily - 7 x weekly - 3 sets - 10 reps - 4\" hold          Goals:  Active       PT Problem       PT Goal 1       Start:  11/26/24    Expected End:  02/24/25       1. Independent HEP to allow for 50% reduction in max ADL " "C/C sx ( 4/10) 2-3wks  2. 0/10 night time sx to allow for uninterrupted sleep x 1wk ( 7/7) 2-3wks  3. Survey score improvement from 46% to 35% (KELLY) 3-4wks  4. Strength increase to allow for improved ADL stdg/walking (from 4-/5 to 4+/5 abdominals) 3-4wks  5. ROM increase to allow for improved ADL car transfers (from 75% to 100% SFB) 3-4wks         PT Goal 2       Start:  11/26/24    Expected End:  02/24/25       1. \"I want my  back/hip/thigh  to feel better\".             "

## 2025-01-14 ENCOUNTER — HOSPITAL ENCOUNTER (OUTPATIENT)
Dept: RADIOLOGY | Facility: EXTERNAL LOCATION | Age: 65
Discharge: HOME | End: 2025-01-14

## 2025-01-14 ENCOUNTER — APPOINTMENT (OUTPATIENT)
Dept: ORTHOPEDIC SURGERY | Facility: CLINIC | Age: 65
End: 2025-01-14
Payer: COMMERCIAL

## 2025-01-14 DIAGNOSIS — M25.551 RIGHT HIP PAIN: ICD-10-CM

## 2025-01-14 PROCEDURE — 1036F TOBACCO NON-USER: CPT | Performed by: SPECIALIST

## 2025-01-14 PROCEDURE — 99214 OFFICE O/P EST MOD 30 MIN: CPT | Performed by: SPECIALIST

## 2025-01-14 ASSESSMENT — PAIN - FUNCTIONAL ASSESSMENT: PAIN_FUNCTIONAL_ASSESSMENT: NO/DENIES PAIN

## 2025-01-14 NOTE — PROGRESS NOTES
Assessment/Plan   Encounter Diagnoses:  Right hip pain  Chronic right-sided low back pain with right-sided sciatica  Assessment: Right lumbar sprain strain and spasm with some right SI joint symptoms.  She states that her pain level is diminished at 1/10 she attributes this to rest.  She has previously been advised to seek consultation with pain management.    Plan:  Continue the range of motion and strengthening exercises on a home exercise basis.  Use anti-inflammatories over-the-counter as need be.  Avoid activities that aggravate including trying to avoid snow shoveling.  Follow-up as needed.       Subjective    Patient ID: Erma Castro is a 64 y.o. female.    Chief Complaint: Follow-up of the Right Hip (RIGHT HIP/X-RAYS 12-13-24)     Last Surgery: No surgery found  Last Surgery Date: No surgery found    HPI  64-year-old female who has been treated for lumbar sprain strain and spasm.  Lumbar x-rays show spondylosis at the L3-4 level in the L4-5 level.  Her symptoms are diminished she attributes this to not being very active and not having to do any snow shoveling recently.    OBJECTIVE: ORTHO EXAM  PHYSICAL EXAM (LUMBAR SPINE)  Gait examination shows the patient to have a normal gait.  There is no spinal deformity.  Skin examination shows no erythema or swelling.  Palpation reveals tenderness to the Right paraspinal area, no tenderness to the Spinous Process and tenderness to the Right Sacroiliac Joint.  Motor Exam:  On Motor evaluation of the lower extremity, the patient had 5/5 strength for the Iliopsoas muscle (L1,L2 - Iliofemoral Nerve), 5/5 for the Gluteus Ryan muscle (L5, S1- Sciatic Nerve), 5/5 strength for the Hamstring muscle (S1 - Sciatic Nerve), 5/5 for the Quadriceps muscle (L3, L4 - Femoral Nerve), 5/5 strength for the Tibialis Anterior muscle (L4, L5 - Deep Peroneal Nerve), 5/5 strength for the Gastrocnemius muscle (S1, S2 - Tibial Nerve) and 5/5 strength for the Extensor Hallucis Longus Muscle  (L5 - Deep Peroneal Nerve).  Sensory Exam:  On Sensory evaluation of the lower extremity, the patient had intact sensation to light touch in the dermatomal distributions of the Right L1, L2, L3, L4, L5, S1 and the Left L1, L2, L3, L4, L5, S1.  Reflex Exam:  Evaluation of the patient's reflexes shows that the shows that the patients Patellar Tendon reflex is at 2+ , and the patient Achilles Tendon reflex is at 2+.    Negative straight leg raise bilaterally.  No pain in the sciatic notch.  Some mild tenderness over the right SI joint.    IMAGE RESULTS:  Point of Care Ultrasound  These images are not reportable by radiology and will not be interpreted   by  Radiologists.      ULTRASOUND      Procedures     Orders Placed This Encounter    Point of Care Ultrasound

## 2025-01-14 NOTE — ASSESSMENT & PLAN NOTE
Assessment: Right lumbar sprain strain and spasm with some right SI joint symptoms.  She states that her pain level is diminished at 1/10 she attributes this to rest.  She has previously been advised to seek consultation with pain management.    Plan:  Continue the range of motion and strengthening exercises on a home exercise basis.  Use anti-inflammatories over-the-counter as need be.  Avoid activities that aggravate including trying to avoid snow shoveling.  Follow-up as needed.

## 2025-01-16 PROCEDURE — RXMED WILLOW AMBULATORY MEDICATION CHARGE

## 2025-01-17 ENCOUNTER — PHARMACY VISIT (OUTPATIENT)
Dept: PHARMACY | Facility: CLINIC | Age: 65
End: 2025-01-17
Payer: COMMERCIAL

## 2025-01-17 PROCEDURE — RXOTC WILLOW AMBULATORY OTC CHARGE

## 2025-01-29 ENCOUNTER — PHARMACY VISIT (OUTPATIENT)
Dept: PHARMACY | Facility: CLINIC | Age: 65
End: 2025-01-29
Payer: COMMERCIAL

## 2025-01-29 PROCEDURE — RXMED WILLOW AMBULATORY MEDICATION CHARGE

## 2025-01-29 RX ORDER — LAMOTRIGINE 150 MG/1
TABLET ORAL
Qty: 60 TABLET | Refills: 1 | OUTPATIENT
Start: 2025-01-29

## 2025-02-06 ENCOUNTER — DOCUMENTATION (OUTPATIENT)
Dept: PHYSICAL THERAPY | Facility: CLINIC | Age: 65
End: 2025-02-06
Payer: COMMERCIAL

## 2025-02-06 DIAGNOSIS — M54.41 CHRONIC RIGHT-SIDED LOW BACK PAIN WITH RIGHT-SIDED SCIATICA: Primary | ICD-10-CM

## 2025-02-06 DIAGNOSIS — M19.90 ARTHRITIS: ICD-10-CM

## 2025-02-06 DIAGNOSIS — G89.29 CHRONIC RIGHT-SIDED LOW BACK PAIN WITH RIGHT-SIDED SCIATICA: Primary | ICD-10-CM

## 2025-02-06 NOTE — PROGRESS NOTES
Physical Therapy    Discharge Summary    Name: Erma Castro  MRN: [id number]  : 1960  Date: 25    Discharge Summary: PT         Has been seen in clinical physical therapy beginning on 24 for 9  visit (s); there has been no further visits attended. DC from PT

## 2025-02-26 ENCOUNTER — PHARMACY VISIT (OUTPATIENT)
Dept: PHARMACY | Facility: CLINIC | Age: 65
End: 2025-02-26
Payer: COMMERCIAL

## 2025-02-26 PROCEDURE — RXMED WILLOW AMBULATORY MEDICATION CHARGE

## 2025-02-26 PROCEDURE — RXOTC WILLOW AMBULATORY OTC CHARGE

## 2025-03-03 ENCOUNTER — TELEPHONE (OUTPATIENT)
Dept: PRIMARY CARE | Facility: CLINIC | Age: 65
End: 2025-03-03
Payer: COMMERCIAL

## 2025-03-03 NOTE — TELEPHONE ENCOUNTER
Patient states that her spouse (only  in household) fell & broke hip & patient has narcolepsy & unable to drive further than [place]. She has medication issues that she needs to discuss ASAP due to this change. Set up a virtual for her with KB on 3/5/25 @ 2:10. Please advise if this is ok or if she HAS to see her in person.

## 2025-03-05 ENCOUNTER — TELEMEDICINE (OUTPATIENT)
Dept: PRIMARY CARE | Facility: CLINIC | Age: 65
End: 2025-03-05
Payer: COMMERCIAL

## 2025-03-05 DIAGNOSIS — G47.421 NARCOLEPSY DUE TO UNDERLYING CONDITION WITH CATAPLEXY (HHS-HCC): Primary | ICD-10-CM

## 2025-03-05 ASSESSMENT — ENCOUNTER SYMPTOMS
WEAKNESS: 0
PALPITATIONS: 0
SHORTNESS OF BREATH: 0
FATIGUE: 1
HEADACHES: 0
LIGHT-HEADEDNESS: 0

## 2025-03-05 NOTE — PROGRESS NOTES
"Subjective   Patient ID: Erma Castro is a 64 y.o. female who presents for No chief complaint on file..    HPI   Pt here for telephone encounter to discuss  Pt and physician are at two separate locations.   Pt was verbally consented for the encounter  Phone call regarding acute concerns with hypersomnolence and narcolepsy symptoms. Reports she can drift off to sleep at any time. She denies snoring, or dry mouth and does not think she has a sleep problem due to this. After reviewing chart, patient has extensive history with Bipolar 2, was following with Dr. Smith, no longer is. 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 Patient at some point was prescribed ritalin for this. She would take it as needed. She recently saw a sleep doctor in January, who reviewed her testing from 2012 and determined her results were not consistent with narcolepsy, therefore, ritalin is an inappropriate medication, and ordered a new sleep study. Pt claims she cannot do the sleep study through this doctor due to cost.   In the phone conversation patient is frustrated and argumentative, she would like ritalin \"as it worked in the past\" and she needs to have it as needed. I discussed that it is not appropriate medical treatment since she does not have a diagnosis to support this medication. Encouraged her to get a repeat sleep study through my office and we will send to Dr. Garcia office after we get results   Review of Systems   Constitutional:  Positive for fatigue.   Respiratory:  Negative for shortness of breath.    Cardiovascular:  Negative for chest pain and palpitations.   Neurological:  Negative for weakness, light-headedness and headaches.       Objective   There were no vitals taken for this visit.    Physical Exam  Neurological:      Mental Status: She is alert and oriented to person, place, and time.         Assessment/Plan "   Problem List Items Addressed This Visit    None  Visit Diagnoses         Codes    Narcolepsy due to underlying condition with cataplexy ([facility])    -  Primary G47.421    Relevant Orders    Home sleep apnea test (HSAT)             Hypersomnolence  -Likely due to Bipolar 2 disorder, medication, and sleep disorder  -Repeat HSAT study ordered  -Will send results to Dr. Garcia office  -Will not continue Ritalin medication at this time as diagnosis does not support it

## 2025-03-20 PROCEDURE — RXMED WILLOW AMBULATORY MEDICATION CHARGE

## 2025-03-22 ENCOUNTER — PHARMACY VISIT (OUTPATIENT)
Dept: PHARMACY | Facility: CLINIC | Age: 65
End: 2025-03-22
Payer: COMMERCIAL

## 2025-03-26 PROCEDURE — RXMED WILLOW AMBULATORY MEDICATION CHARGE

## 2025-03-27 DIAGNOSIS — S76.011A HIP STRAIN, RIGHT, INITIAL ENCOUNTER: ICD-10-CM

## 2025-03-27 RX ORDER — MELOXICAM 15 MG/1
15 TABLET ORAL DAILY
Qty: 30 TABLET | Refills: 3 | Status: SHIPPED | OUTPATIENT
Start: 2025-03-27

## 2025-03-28 ENCOUNTER — PHARMACY VISIT (OUTPATIENT)
Dept: PHARMACY | Facility: CLINIC | Age: 65
End: 2025-03-28
Payer: COMMERCIAL

## 2025-04-19 PROCEDURE — RXMED WILLOW AMBULATORY MEDICATION CHARGE

## 2025-04-23 ENCOUNTER — PHARMACY VISIT (OUTPATIENT)
Dept: PHARMACY | Facility: CLINIC | Age: 65
End: 2025-04-23
Payer: COMMERCIAL

## 2025-05-13 ENCOUNTER — APPOINTMENT (OUTPATIENT)
Dept: PRIMARY CARE | Facility: CLINIC | Age: 65
End: 2025-05-13
Payer: COMMERCIAL

## 2025-05-13 VITALS
DIASTOLIC BLOOD PRESSURE: 83 MMHG | SYSTOLIC BLOOD PRESSURE: 139 MMHG | HEART RATE: 78 BPM | BODY MASS INDEX: 33.84 KG/M2 | WEIGHT: 198.2 LBS | HEIGHT: 64 IN

## 2025-05-13 DIAGNOSIS — Z00.00 HEALTH MAINTENANCE EXAMINATION: ICD-10-CM

## 2025-05-13 DIAGNOSIS — Z12.31 BREAST CANCER SCREENING BY MAMMOGRAM: ICD-10-CM

## 2025-05-13 DIAGNOSIS — Z13.6 SCREENING FOR CARDIOVASCULAR CONDITION: Primary | ICD-10-CM

## 2025-05-13 DIAGNOSIS — F41.9 ANXIETY DISORDER, UNSPECIFIED TYPE: ICD-10-CM

## 2025-05-13 DIAGNOSIS — F31.81 BIPOLAR 2 DISORDER (MULTI): ICD-10-CM

## 2025-05-13 PROCEDURE — RXMED WILLOW AMBULATORY MEDICATION CHARGE

## 2025-05-13 PROCEDURE — 99396 PREV VISIT EST AGE 40-64: CPT

## 2025-05-13 PROCEDURE — 1036F TOBACCO NON-USER: CPT

## 2025-05-13 PROCEDURE — 3008F BODY MASS INDEX DOCD: CPT

## 2025-05-13 ASSESSMENT — ENCOUNTER SYMPTOMS
LIGHT-HEADEDNESS: 0
PALPITATIONS: 0
NERVOUS/ANXIOUS: 0
HEADACHES: 0
BACK PAIN: 1
DYSURIA: 0
ARTHRALGIAS: 1
FATIGUE: 0
MYALGIAS: 1
SHORTNESS OF BREATH: 0

## 2025-05-13 NOTE — PROGRESS NOTES
"Subjective   Patient ID: Erma Castro is a 64 y.o. female who presents for Follow-up (6 month).    HPI   Here today for follow up   Still concerned about narcolepsy vs bipolar. She has a specific sleep doctor she would like to establish with, recommend she call and get scheduled. Will send referral if need be.     Hip pain, she completed PT and she is doing her exercises at home and that has been helpful. Meloxicam is also helpful.     She is still following with Dr Pugh at [facility] and he still believes the lamictal is the best medication for her. She does follow with a counselor for independent counseling and marriage counseling.  Review of Systems   Constitutional:  Negative for fatigue.   Respiratory:  Negative for shortness of breath.    Cardiovascular:  Negative for chest pain, palpitations and leg swelling.   Genitourinary:  Negative for dysuria.   Musculoskeletal:  Positive for arthralgias, back pain and myalgias.   Neurological:  Negative for light-headedness and headaches.   Psychiatric/Behavioral:  The patient is not nervous/anxious.        Objective   /83 (Patient Position: Sitting)   Pulse 78   Ht 1.613 m (5' 3.5\")   Wt 89.9 kg (198 lb 3.2 oz)   BMI 34.56 kg/m²     Physical Exam  Cardiovascular:      Rate and Rhythm: Normal rate and regular rhythm.      Heart sounds: Normal heart sounds.   Musculoskeletal:      Right lower leg: No edema.      Left lower leg: No edema.   Skin:     Capillary Refill: Capillary refill takes less than 2 seconds.   Neurological:      Mental Status: She is alert and oriented to person, place, and time.         Assessment/Plan   Problem List Items Addressed This Visit    None      Back pain with right sided sciatica   -Finished PT   -Meloxicam PRN for pain    Narcolepsy vs bipolar  -HSAT ordered   -She wants to follow with a sleep medicine doctor that she found, if they need a referral she will call   -Continue following with Dr Pugh at [facility]   -Counselor " for herself and marriage counselor     Health maintenance  -lab work ordered   -Mammo ordered

## 2025-05-15 ENCOUNTER — PHARMACY VISIT (OUTPATIENT)
Dept: PHARMACY | Facility: CLINIC | Age: 65
End: 2025-05-15
Payer: COMMERCIAL

## 2025-05-15 PROCEDURE — RXOTC WILLOW AMBULATORY OTC CHARGE

## 2025-05-22 ENCOUNTER — PHARMACY VISIT (OUTPATIENT)
Dept: PHARMACY | Facility: CLINIC | Age: 65
End: 2025-05-22
Payer: COMMERCIAL

## 2025-05-22 PROCEDURE — RXMED WILLOW AMBULATORY MEDICATION CHARGE

## 2025-06-12 ENCOUNTER — TELEPHONE (OUTPATIENT)
Dept: PRIMARY CARE | Facility: CLINIC | Age: 65
End: 2025-06-12
Payer: COMMERCIAL

## 2025-06-12 DIAGNOSIS — B35.9 TINEA: Primary | ICD-10-CM

## 2025-06-12 PROCEDURE — RXMED WILLOW AMBULATORY MEDICATION CHARGE

## 2025-06-12 RX ORDER — CLOTRIMAZOLE 1 %
CREAM (GRAM) TOPICAL 2 TIMES DAILY
Qty: 30 G | Refills: 0 | Status: SHIPPED | OUTPATIENT
Start: 2025-06-12 | End: 2025-08-07

## 2025-06-12 NOTE — TELEPHONE ENCOUNTER
Patient called in and stated that she is has a rash under her stomach in the roll between stomach and hysterectomy scar. She state it is red and itchy and sore. She stated a couple days ago she was outside in the shed moving boxes and sweating. She is wondering if she could get a cream to put on it. She doesn't tolerate medications well they all cause her constipation, or diarrhea. She is also has issues with weird sleep things like narcolepsy they are working on, she would like to know if the cream she applies will have any side effects and if so if it will cause drowsiness. Thanks!

## 2025-06-12 NOTE — TELEPHONE ENCOUNTER
Let her know I sent a topical antifungal cream to [facility].  The medication does not have any listed side effects that imply it would cause drowsiness

## 2025-06-14 ENCOUNTER — PHARMACY VISIT (OUTPATIENT)
Dept: PHARMACY | Facility: CLINIC | Age: 65
End: 2025-06-14
Payer: COMMERCIAL

## 2025-06-16 ENCOUNTER — TELEPHONE (OUTPATIENT)
Dept: PRIMARY CARE | Facility: CLINIC | Age: 65
End: 2025-06-16
Payer: COMMERCIAL

## 2025-06-16 NOTE — TELEPHONE ENCOUNTER
Called pt for HUANG, we scheduled an apt w her tomorrow and she was advised if rash grrts worse to proceed to the UC or ED. Pt started asking medical questions and I mentioned I can ask Arik if she would like but I can not advise at this point. She asked for my job title and KL's as well. Again stated I can ask SW if she would like and she said no its okay.

## 2025-06-16 NOTE — TELEPHONE ENCOUNTER
Please call and see if patient can come in tomorrow at 3:20 pm with Yris Yee. If that does not work or she does not want to wait she needs to go to urgent care to be evaluated. That appointment is the soonest I can squeeze her in.

## 2025-06-16 NOTE — TELEPHONE ENCOUNTER
06/16/25  Patient has a skin rash across her stomach in the fold of her incision site.  It is looking red and seeping.  It looks like it is going to bleed and It is approximately an inch wide.  An Rs was sent in for it, and it has gotten worse.  Spreading to underneath her breasts.     Pt Ph: [phone]

## 2025-06-17 ENCOUNTER — OFFICE VISIT (OUTPATIENT)
Dept: PRIMARY CARE | Facility: CLINIC | Age: 65
End: 2025-06-17
Payer: COMMERCIAL

## 2025-06-17 ENCOUNTER — PHARMACY VISIT (OUTPATIENT)
Dept: PHARMACY | Facility: CLINIC | Age: 65
End: 2025-06-17
Payer: COMMERCIAL

## 2025-06-17 VITALS
BODY MASS INDEX: 33.29 KG/M2 | WEIGHT: 195 LBS | SYSTOLIC BLOOD PRESSURE: 135 MMHG | HEART RATE: 80 BPM | OXYGEN SATURATION: 98 % | DIASTOLIC BLOOD PRESSURE: 84 MMHG | HEIGHT: 64 IN

## 2025-06-17 DIAGNOSIS — B37.2 CANDIDIASIS OF SKIN: Primary | ICD-10-CM

## 2025-06-17 PROCEDURE — RXMED WILLOW AMBULATORY MEDICATION CHARGE

## 2025-06-17 PROCEDURE — 1036F TOBACCO NON-USER: CPT | Performed by: NURSE PRACTITIONER

## 2025-06-17 PROCEDURE — 3008F BODY MASS INDEX DOCD: CPT | Performed by: NURSE PRACTITIONER

## 2025-06-17 PROCEDURE — 99213 OFFICE O/P EST LOW 20 MIN: CPT | Performed by: NURSE PRACTITIONER

## 2025-06-17 RX ORDER — FLUCONAZOLE 100 MG/1
100 TABLET ORAL DAILY
Qty: 5 TABLET | Refills: 0 | Status: SHIPPED | OUTPATIENT
Start: 2025-06-17

## 2025-06-17 ASSESSMENT — ENCOUNTER SYMPTOMS
PALPITATIONS: 0
SHORTNESS OF BREATH: 0
NAUSEA: 1
WHEEZING: 0
ABDOMINAL PAIN: 0
DIARRHEA: 0
COUGH: 0
ABDOMINAL DISTENTION: 0
CONSTIPATION: 0
CHILLS: 0
VOMITING: 0
FEVER: 0

## 2025-06-17 NOTE — PROGRESS NOTES
"Subjective   Patient ID: Erma Castro is a 64 y.o. female who presents for Rash (stomach).  65 yo female presents for rash for over a week in her groin, under her abd, and under her right breast.  States she was working in the heat with a brief on.  Another provider Rx'd Lotrimin cream without success.  She has been applying bid x 3 days.          Review of Systems   Constitutional:  Negative for chills and fever.   Respiratory:  Negative for cough, shortness of breath and wheezing.    Cardiovascular:  Negative for chest pain and palpitations.   Gastrointestinal:  Positive for nausea. Negative for abdominal distention, abdominal pain, constipation, diarrhea and vomiting.   Skin:  Positive for rash.       Objective   Physical Exam  Vitals and nursing note reviewed.   Constitutional:       General: She is not in acute distress.  Cardiovascular:      Rate and Rhythm: Normal rate and regular rhythm.      Pulses: Normal pulses.      Heart sounds: Normal heart sounds. No murmur heard.     No friction rub. No gallop.   Pulmonary:      Effort: Pulmonary effort is normal.      Breath sounds: Normal breath sounds. No wheezing, rhonchi or rales.   Skin:     General: Skin is warm and dry.      Findings: Erythema present.      Comments: Noted candidiasis under right breast with moderate erythema and under abd. Folds into her right groin area.     Neurological:      Mental Status: She is alert.       /84   Pulse 80   Ht 1.613 m (5' 3.5\")   Wt 88.5 kg (195 lb)   SpO2 98%   BMI 34.00 kg/m²     Current Medications[1]   Medical History[2]   Surgical History[3]     Family History[4]     Assessment/Plan   Problem List Items Addressed This Visit    None  Visit Diagnoses         Candidiasis of skin    -  Primary    Relevant Medications    fluconazole (Diflucan) 100 mg tablet        She can wash with selsun blue shampoo daily and apply lotrimin cream bid.  Start her on Diflucan 100mg daily x 5 days.  She will call if no " results.           [1]   Current Outpatient Medications:     acetaminophen (Tylenol Arthritis Pain) 650 mg ER tablet, Take 1 tablet (650 mg) by mouth every 8 hours if needed for mild pain (1 - 3)., Disp: , Rfl:     clotrimazole (Lotrimin) 1 % cream, Apply topically 2 times a day. Apply to affected area., Disp: 30 g, Rfl: 0    lamoTRIgine (LaMICtal) 150 mg tablet, Take 2 tablets at Bedtime (Patient taking differently: Take 1.5 tablets (225 mg) by mouth once daily.), Disp: 60 tablet, Rfl: 1    melatonin 3 mg capsule, Take 3 mg by mouth once daily in the evening., Disp: , Rfl:     meloxicam (Mobic) 15 mg tablet, Take 1 tablet (15 mg) by mouth once daily., Disp: 30 tablet, Rfl: 3    methylphenidate (Ritalin) 5 mg tablet, Take 1 tablet (5 mg) by mouth once daily as needed., Disp: , Rfl:     diclofenac sodium (Voltaren) 1 % gel, Apply 4.5 inches (4 g) topically 4 times a day. (Patient not taking: Reported on 6/17/2025), Disp: 100 g, Rfl: 1    fluconazole (Diflucan) 100 mg tablet, Take 1 tablet (100 mg) by mouth once daily., Disp: 5 tablet, Rfl: 0    lamoTRIgine (LaMICtal) 150 mg tablet, Take 2 tablets (300 mg) by mouth once daily at bedtime. (Patient not taking: Reported on 6/17/2025), Disp: 60 tablet, Rfl: 1    lamoTRIgine (LaMICtal) 150 mg tablet, Take 2 Tablet By Mouth Bedtime; (Patient not taking: Reported on 6/17/2025), Disp: 60 tablet, Rfl: 1    lamoTRIgine (LaMICtal) 150 mg tablet, Take 2 Tablet By Mouth At Bedtime; (Patient not taking: Reported on 6/17/2025), Disp: 60 tablet, Rfl: 1    lamoTRIgine (LaMICtal) 150 mg tablet, Take 2 Tablets By Mouth Bedtime; (Patient not taking: Reported on 6/17/2025), Disp: 60 tablet, Rfl: 1  [2]   Past Medical History:  Diagnosis Date    Anxiety disorder, unspecified 10/17/2018    Anxiety and depression    Dyshidrosis (pompholyx) 10/17/2018    Dyshidrotic eczema    Personal history of diseases of the skin and subcutaneous tissue 10/17/2018    History of rosacea    Personal history  of other diseases of the musculoskeletal system and connective tissue 10/17/2018    History of tenosynovitis    Personal history of other endocrine, nutritional and metabolic disease 10/17/2018    History of hypoglycemia    Personal history of other mental and behavioral disorders 10/17/2018    History of bipolar disorder   [3]   Past Surgical History:  Procedure Laterality Date    OTHER SURGICAL HISTORY  10/17/2018    Laparoscopy With Total Hysterectomy With Bilateral Salpingo-Oophorectomy    OTHER SURGICAL HISTORY  06/01/2022    [place] tooth extraction   [4] No family history on file.

## 2025-06-18 PROCEDURE — RXMED WILLOW AMBULATORY MEDICATION CHARGE

## 2025-06-20 ENCOUNTER — PHARMACY VISIT (OUTPATIENT)
Dept: PHARMACY | Facility: CLINIC | Age: 65
End: 2025-06-20
Payer: COMMERCIAL

## 2025-06-20 ENCOUNTER — TELEPHONE (OUTPATIENT)
Dept: PRIMARY CARE | Facility: CLINIC | Age: 65
End: 2025-06-20
Payer: COMMERCIAL

## 2025-06-20 DIAGNOSIS — B37.2 CANDIDIASIS OF SKIN: Primary | ICD-10-CM

## 2025-06-20 PROCEDURE — RXMED WILLOW AMBULATORY MEDICATION CHARGE

## 2025-06-20 RX ORDER — PREDNISONE 10 MG/1
10 TABLET ORAL DAILY
Qty: 5 TABLET | Refills: 0 | Status: SHIPPED | OUTPATIENT
Start: 2025-06-20 | End: 2025-06-25

## 2025-06-20 NOTE — TELEPHONE ENCOUNTER
"Patient called in, states that she was seen for a rash, thinks she is having a reaction to the cream she is using. She would like to know to know if there is a way she could have \"more of the oral medication\".     Patient stated it is ok to leave a detailed message on the voice mail if she is unable to answer.   "

## 2025-06-20 NOTE — TELEPHONE ENCOUNTER
I called patient and left a detailed message for her to call back and leave a detailed message on my VM if I do not answer, with her reaction that she is having.

## 2025-06-26 ENCOUNTER — PHARMACY VISIT (OUTPATIENT)
Dept: PHARMACY | Facility: CLINIC | Age: 65
End: 2025-06-26
Payer: COMMERCIAL

## 2025-06-26 PROCEDURE — RXMED WILLOW AMBULATORY MEDICATION CHARGE

## 2025-06-26 PROCEDURE — RXOTC WILLOW AMBULATORY OTC CHARGE

## 2025-07-23 PROCEDURE — RXMED WILLOW AMBULATORY MEDICATION CHARGE

## 2025-07-24 ENCOUNTER — PHARMACY VISIT (OUTPATIENT)
Dept: PHARMACY | Facility: CLINIC | Age: 65
End: 2025-07-24
Payer: COMMERCIAL

## 2025-07-24 PROCEDURE — RXOTC WILLOW AMBULATORY OTC CHARGE

## 2025-07-24 PROCEDURE — RXMED WILLOW AMBULATORY MEDICATION CHARGE

## 2025-07-31 DIAGNOSIS — S76.011A HIP STRAIN, RIGHT, INITIAL ENCOUNTER: ICD-10-CM

## 2025-07-31 RX ORDER — MELOXICAM 15 MG/1
15 TABLET ORAL DAILY
Qty: 30 TABLET | Refills: 1 | Status: SHIPPED | OUTPATIENT
Start: 2025-07-31

## 2025-08-06 ENCOUNTER — PHARMACY VISIT (OUTPATIENT)
Dept: PHARMACY | Facility: CLINIC | Age: 65
End: 2025-08-06
Payer: COMMERCIAL

## 2025-08-15 ENCOUNTER — TELEPHONE (OUTPATIENT)
Dept: PRIMARY CARE | Facility: CLINIC | Age: 65
End: 2025-08-15
Payer: COMMERCIAL

## 2025-08-15 ENCOUNTER — PHARMACY VISIT (OUTPATIENT)
Dept: PHARMACY | Facility: CLINIC | Age: 65
End: 2025-08-15
Payer: COMMERCIAL

## 2025-08-15 DIAGNOSIS — B37.89 CANDIDIASIS OF BREAST: Primary | ICD-10-CM

## 2025-08-15 PROCEDURE — RXMED WILLOW AMBULATORY MEDICATION CHARGE

## 2025-08-15 RX ORDER — NYSTATIN 100000 [USP'U]/G
1 POWDER TOPICAL 2 TIMES DAILY
Qty: 30 G | Refills: 1 | Status: SHIPPED | OUTPATIENT
Start: 2025-08-15 | End: 2026-08-15

## 2025-08-25 ENCOUNTER — PHARMACY VISIT (OUTPATIENT)
Dept: PHARMACY | Facility: CLINIC | Age: 65
End: 2025-08-25
Payer: COMMERCIAL

## 2025-08-25 PROCEDURE — RXOTC WILLOW AMBULATORY OTC CHARGE

## 2025-08-25 PROCEDURE — RXMED WILLOW AMBULATORY MEDICATION CHARGE

## 2025-11-13 ENCOUNTER — APPOINTMENT (OUTPATIENT)
Dept: PRIMARY CARE | Facility: CLINIC | Age: 65
End: 2025-11-13
Payer: COMMERCIAL